# Patient Record
Sex: FEMALE | Race: WHITE | Employment: UNEMPLOYED | URBAN - METROPOLITAN AREA
[De-identification: names, ages, dates, MRNs, and addresses within clinical notes are randomized per-mention and may not be internally consistent; named-entity substitution may affect disease eponyms.]

---

## 2018-01-24 PROBLEM — F25.1 SCHIZOAFFECTIVE DISORDER, DEPRESSIVE TYPE (HCC): Status: ACTIVE | Noted: 2018-01-24

## 2018-03-23 ENCOUNTER — HOSPITAL ENCOUNTER (EMERGENCY)
Age: 28
Discharge: HOME | End: 2018-03-23
Payer: COMMERCIAL

## 2018-03-23 VITALS
DIASTOLIC BLOOD PRESSURE: 76 MMHG | RESPIRATION RATE: 16 BRPM | OXYGEN SATURATION: 95 % | HEIGHT: 64 IN | SYSTOLIC BLOOD PRESSURE: 112 MMHG | TEMPERATURE: 98.3 F | BODY MASS INDEX: 18.78 KG/M2 | WEIGHT: 110 LBS | HEART RATE: 90 BPM

## 2018-03-23 DIAGNOSIS — N89.8 VAGINAL DISCHARGE: ICD-10-CM

## 2018-03-23 DIAGNOSIS — Z20.2 EXPOSURE TO CHLAMYDIA: ICD-10-CM

## 2018-03-23 DIAGNOSIS — A59.9 TRICHOMONAS INFECTION: Primary | ICD-10-CM

## 2018-03-23 LAB
BACTERIA WET PREP: ABNORMAL
BACTERIA: ABNORMAL /HPF
BILIRUBIN URINE: NEGATIVE
BLOOD, URINE: ABNORMAL
CLARITY: ABNORMAL
CLUE CELLS: ABNORMAL
COLOR: ABNORMAL
EPITHELIAL CELLS WET PREP: ABNORMAL
EPITHELIAL CELLS, UA: ABNORMAL /HPF
GLUCOSE URINE: NEGATIVE MG/DL
HCG(URINE) PREGNANCY TEST: NEGATIVE
KETONES, URINE: NEGATIVE MG/DL
LEUKOCYTE ESTERASE, URINE: NEGATIVE
NITRITE, URINE: POSITIVE
PH UA: 5.5 (ref 5–9)
PROTEIN UA: NEGATIVE MG/DL
RBC UA: ABNORMAL /HPF (ref 0–2)
RBC WET PREP: ABNORMAL
SOURCE WET PREP: ABNORMAL
SPECIFIC GRAVITY UA: 1.02 (ref 1–1.03)
TRICHOMONAS PREP: ABNORMAL
UROBILINOGEN, URINE: 1 E.U./DL
WBC UA: ABNORMAL /HPF (ref 0–5)
WBC WET PREP: ABNORMAL
YEAST WET PREP: ABNORMAL

## 2018-03-23 PROCEDURE — 81025 URINE PREGNANCY TEST: CPT

## 2018-03-23 PROCEDURE — 96372 THER/PROPH/DIAG INJ SC/IM: CPT

## 2018-03-23 PROCEDURE — 99283 EMERGENCY DEPT VISIT LOW MDM: CPT

## 2018-03-23 PROCEDURE — 6370000000 HC RX 637 (ALT 250 FOR IP): Performed by: NURSE PRACTITIONER

## 2018-03-23 PROCEDURE — 6360000002 HC RX W HCPCS: Performed by: NURSE PRACTITIONER

## 2018-03-23 PROCEDURE — 87591 N.GONORRHOEAE DNA AMP PROB: CPT

## 2018-03-23 PROCEDURE — 81001 URINALYSIS AUTO W/SCOPE: CPT

## 2018-03-23 PROCEDURE — 87491 CHLMYD TRACH DNA AMP PROBE: CPT

## 2018-03-23 PROCEDURE — 87210 SMEAR WET MOUNT SALINE/INK: CPT

## 2018-03-23 RX ORDER — CEFTRIAXONE SODIUM 250 MG/1
250 INJECTION, POWDER, FOR SOLUTION INTRAMUSCULAR; INTRAVENOUS ONCE
Status: COMPLETED | OUTPATIENT
Start: 2018-03-23 | End: 2018-03-23

## 2018-03-23 RX ORDER — METRONIDAZOLE 500 MG/1
2000 TABLET ORAL ONCE
Status: COMPLETED | OUTPATIENT
Start: 2018-03-23 | End: 2018-03-23

## 2018-03-23 RX ORDER — AZITHROMYCIN 250 MG/1
1000 TABLET, FILM COATED ORAL ONCE
Status: COMPLETED | OUTPATIENT
Start: 2018-03-23 | End: 2018-03-23

## 2018-03-23 RX ORDER — ONDANSETRON 4 MG/1
4 TABLET, ORALLY DISINTEGRATING ORAL ONCE
Status: COMPLETED | OUTPATIENT
Start: 2018-03-23 | End: 2018-03-23

## 2018-03-23 RX ADMIN — PENICILLIN G BENZATHINE 1.2 MILLION UNITS: 1200000 INJECTION, SUSPENSION INTRAMUSCULAR at 17:56

## 2018-03-23 RX ADMIN — METRONIDAZOLE 2000 MG: 500 TABLET ORAL at 18:19

## 2018-03-23 RX ADMIN — AZITHROMYCIN 1000 MG: 250 TABLET, FILM COATED ORAL at 17:53

## 2018-03-23 RX ADMIN — ONDANSETRON 4 MG: 4 TABLET, ORALLY DISINTEGRATING ORAL at 18:19

## 2018-03-23 RX ADMIN — CEFTRIAXONE SODIUM 250 MG: 250 INJECTION, POWDER, FOR SOLUTION INTRAMUSCULAR; INTRAVENOUS at 17:56

## 2018-03-23 NOTE — ED NOTES
Discharged- to follow with Jan Mendez [address] , [address] [facility]  03/23/18 2837

## 2018-03-26 LAB
N GONORRHOEAE AMPLIFIED DET: ABNORMAL
ORGANISM: ABNORMAL

## 2018-10-20 ENCOUNTER — HOSPITAL ENCOUNTER (EMERGENCY)
Age: 28
Discharge: HOME | End: 2018-10-20
Payer: MEDICAID

## 2018-10-20 VITALS
RESPIRATION RATE: 17 BRPM | WEIGHT: 110 LBS | DIASTOLIC BLOOD PRESSURE: 88 MMHG | SYSTOLIC BLOOD PRESSURE: 136 MMHG | TEMPERATURE: 97.3 F | HEART RATE: 100 BPM | BODY MASS INDEX: 18.88 KG/M2 | OXYGEN SATURATION: 99 %

## 2018-10-20 DIAGNOSIS — K02.9 PAIN DUE TO DENTAL CARIES: Primary | ICD-10-CM

## 2018-10-20 PROCEDURE — 99282 EMERGENCY DEPT VISIT SF MDM: CPT

## 2018-10-20 RX ORDER — NAPROXEN 500 MG/1
500 TABLET ORAL 2 TIMES DAILY
Qty: 14 TABLET | Refills: 0 | Status: SHIPPED | OUTPATIENT
Start: 2018-10-20 | End: 2020-04-17 | Stop reason: ALTCHOICE

## 2018-10-20 RX ORDER — PENICILLIN V POTASSIUM 500 MG/1
500 TABLET ORAL 4 TIMES DAILY
Qty: 40 TABLET | Refills: 0 | Status: SHIPPED | OUTPATIENT
Start: 2018-10-20 | End: 2018-10-30

## 2018-10-20 ASSESSMENT — PAIN DESCRIPTION - ORIENTATION: ORIENTATION: LEFT

## 2018-10-20 ASSESSMENT — PAIN DESCRIPTION - PAIN TYPE: TYPE: ACUTE PAIN

## 2018-10-20 ASSESSMENT — PAIN DESCRIPTION - LOCATION: LOCATION: TEETH

## 2018-10-20 ASSESSMENT — PAIN SCALES - GENERAL: PAINLEVEL_OUTOF10: 10

## 2018-10-20 ASSESSMENT — PAIN DESCRIPTION - DESCRIPTORS: DESCRIPTORS: ACHING

## 2018-10-20 NOTE — ED PROVIDER NOTES
dental abscess. Patient handling secretions without difficulty. Patient has no unilateral swelling of the throat or uveal deviation. At this time patient will be given Pen-Vee K, naproxen and lidocaine swish and spit. She was instructed to follow-up with  [facility] whose information was provided. She was also instructed to follow-up with her primary care provider. Patient is agreeable with plan. Patient was explicitly instructed on specific signs and symptoms on which to return to the emergency room for. Patient was instructed to return to the ER for any new or worsening symptoms. Additional discharge instructions were given verbally. All questions were answered. Patient is comfortable and agreeable with discharge plan. Patient in no acute distress and non-toxic in appearance. At this time the patient is without objective evidence of an acute process requiring hospitalization or inpatient management. They have remained hemodynamically stable throughout their entire ED visit and are stable for discharge with outpatient follow-up. The plan has been discussed in detail and they are aware of the specific conditions for emergent return, as well as the importance of follow-up. The emergency provider has spoken with the patient and discussed todays results, in addition to providing specific details for the plan of care and counseling regarding the diagnosis and prognosis. Questions are answered at this time and they are agreeable with the plan. Assessment      1.  Pain due to dental caries      Plan   Discharge to home  Patient condition is stable    New Medications     New Prescriptions    LIDOCAINE VISCOUS (XYLOCAINE) 2 % SOLUTION    Take 10 mLs by mouth as needed for Irritation    NAPROXEN (NAPROSYN) 500 MG TABLET    Take 1 tablet by mouth 2 times daily for 7 days    PENICILLIN V POTASSIUM (VEETID) 500 MG TABLET    Take 1 tablet by mouth 4 times daily for 10 days     Electronically

## 2018-10-22 ENCOUNTER — HOSPITAL ENCOUNTER (EMERGENCY)
Age: 28
Discharge: HOME | End: 2018-10-22
Payer: MEDICAID

## 2018-10-22 VITALS
OXYGEN SATURATION: 98 % | HEART RATE: 100 BPM | BODY MASS INDEX: 22.66 KG/M2 | SYSTOLIC BLOOD PRESSURE: 130 MMHG | RESPIRATION RATE: 18 BRPM | TEMPERATURE: 97.7 F | DIASTOLIC BLOOD PRESSURE: 81 MMHG | WEIGHT: 120 LBS | HEIGHT: 61 IN

## 2018-10-22 DIAGNOSIS — K08.89 PAIN, DENTAL: Primary | ICD-10-CM

## 2018-10-22 PROCEDURE — 6370000000 HC RX 637 (ALT 250 FOR IP): Performed by: NURSE PRACTITIONER

## 2018-10-22 PROCEDURE — 99282 EMERGENCY DEPT VISIT SF MDM: CPT

## 2018-10-22 RX ORDER — OXYCODONE HYDROCHLORIDE AND ACETAMINOPHEN 5; 325 MG/1; MG/1
1 TABLET ORAL ONCE
Status: COMPLETED | OUTPATIENT
Start: 2018-10-22 | End: 2018-10-22

## 2018-10-22 RX ADMIN — OXYCODONE AND ACETAMINOPHEN 1 TABLET: 5; 325 TABLET ORAL at 10:54

## 2018-10-22 ASSESSMENT — PAIN SCALES - GENERAL
PAINLEVEL_OUTOF10: 10
PAINLEVEL_OUTOF10: 7

## 2018-10-22 ASSESSMENT — PAIN DESCRIPTION - ORIENTATION: ORIENTATION: RIGHT

## 2018-10-22 ASSESSMENT — PAIN DESCRIPTION - LOCATION: LOCATION: MOUTH

## 2018-10-22 ASSESSMENT — PAIN DESCRIPTION - DESCRIPTORS: DESCRIPTORS: ACHING

## 2018-10-22 ASSESSMENT — PAIN DESCRIPTION - PAIN TYPE: TYPE: ACUTE PAIN

## 2018-10-22 ASSESSMENT — PAIN DESCRIPTION - FREQUENCY: FREQUENCY: CONTINUOUS

## 2018-10-22 ASSESSMENT — PAIN DESCRIPTION - ONSET: ONSET: ON-GOING

## 2022-05-16 ENCOUNTER — HOSPITAL ENCOUNTER (OUTPATIENT)
Age: 32
Discharge: HOME | End: 2022-05-16
Payer: COMMERCIAL

## 2022-05-16 LAB
GLUCOSE TOLERANCE SCREEN 50G: 101 MG/DL (ref 70–140)
HCT VFR BLD CALC: 38.6 % (ref 34–48)
HEMOGLOBIN: 13 G/DL (ref 11.5–15.5)
MCH RBC QN AUTO: 32.7 PG (ref 26–35)
MCHC RBC AUTO-ENTMCNC: 33.7 % (ref 32–34.5)
MCV RBC AUTO: 97.2 FL (ref 80–99.9)
PDW BLD-RTO: 12.7 FL (ref 11.5–15)
PLATELET # BLD: 197 E9/L (ref 130–450)
PMV BLD AUTO: 11 FL (ref 7–12)
RBC # BLD: 3.97 E12/L (ref 3.5–5.5)
WBC # BLD: 9.4 E9/L (ref 4.5–11.5)

## 2022-05-16 PROCEDURE — 36415 COLL VENOUS BLD VENIPUNCTURE: CPT

## 2022-05-16 PROCEDURE — 82950 GLUCOSE TEST: CPT

## 2022-05-16 PROCEDURE — 85027 COMPLETE CBC AUTOMATED: CPT

## 2022-07-07 ENCOUNTER — HOSPITAL ENCOUNTER (OUTPATIENT)
Age: 32
Discharge: HOME | End: 2022-07-07
Attending: OBSTETRICS & GYNECOLOGY | Admitting: OBSTETRICS & GYNECOLOGY
Payer: COMMERCIAL

## 2022-07-07 VITALS
HEART RATE: 125 BPM | RESPIRATION RATE: 20 BRPM | TEMPERATURE: 98 F | DIASTOLIC BLOOD PRESSURE: 74 MMHG | SYSTOLIC BLOOD PRESSURE: 111 MMHG

## 2022-07-07 PROCEDURE — 99202 OFFICE O/P NEW SF 15 MIN: CPT

## 2022-07-07 NOTE — PROGRESS NOTES
GP: 5/3     EDC:32w6d (8/26/2022    Patient arrived ambulatory to the Labor and Delivery unit with complaints of (Bleeding through 3 panty liners with dark red blood)   EFM applied and fetal well being established. Patient assessed and information obtained   about health and history. Patient oriented to room and call light. Dr. Victoria Red at [phone]  Orders received to call office for prenatals and U.S. Plan of care discussed with patient. Patient verbalizes understanding.

## 2022-07-07 NOTE — PROGRESS NOTES
Dr. Danie Lilly covering for Dr. Aguilar CARRILLO preformed patient Closed and thick and Patient reported bleeding assessed by doctor with no further bleeding. Awaiting patient Prenatals and U/S form office for further orders. Patient on EFM wnl.

## 2022-08-07 LAB — GROUP B STREP CULTURE: NORMAL

## 2023-09-26 LAB
CHLAMYDIA TRACH., AMPLIFIED: NEGATIVE
N. GONORRHEA, AMPLIFIED: NEGATIVE

## 2025-04-28 ENCOUNTER — HOSPITAL ENCOUNTER (INPATIENT)
Facility: HOSPITAL | Age: 35
LOS: 2 days | Discharge: NURSING, REHAB OR LONG-TERM CARE | End: 2025-05-01
Attending: EMERGENCY MEDICINE | Admitting: STUDENT IN AN ORGANIZED HEALTH CARE EDUCATION/TRAINING PROGRAM
Payer: COMMERCIAL

## 2025-04-28 ENCOUNTER — APPOINTMENT (OUTPATIENT)
Dept: CT IMAGING | Age: 35
End: 2025-04-28
Payer: COMMERCIAL

## 2025-04-28 ENCOUNTER — HOSPITAL ENCOUNTER (EMERGENCY)
Age: 35
Discharge: LEFT BEFORE DISCHARGE | End: 2025-04-28
Attending: STUDENT IN AN ORGANIZED HEALTH CARE EDUCATION/TRAINING PROGRAM
Payer: COMMERCIAL

## 2025-04-28 VITALS
SYSTOLIC BLOOD PRESSURE: 91 MMHG | DIASTOLIC BLOOD PRESSURE: 58 MMHG | HEIGHT: 62 IN | TEMPERATURE: 97.8 F | HEART RATE: 64 BPM | RESPIRATION RATE: 18 BRPM | OXYGEN SATURATION: 95 % | WEIGHT: 156 LBS | BODY MASS INDEX: 28.71 KG/M2

## 2025-04-28 DIAGNOSIS — R74.01 TRANSAMINITIS: ICD-10-CM

## 2025-04-28 DIAGNOSIS — Z86.19 HISTORY OF HEPATITIS C: ICD-10-CM

## 2025-04-28 DIAGNOSIS — R74.01 TRANSAMINITIS: Primary | ICD-10-CM

## 2025-04-28 DIAGNOSIS — K59.03 DRUG-INDUCED CONSTIPATION: ICD-10-CM

## 2025-04-28 DIAGNOSIS — Z53.29 LEFT AGAINST MEDICAL ADVICE: Primary | ICD-10-CM

## 2025-04-28 LAB
ALBUMIN SERPL-MCNC: 3.8 G/DL (ref 3.5–5.2)
ALP SERPL-CCNC: 314 U/L (ref 35–104)
ALT SERPL-CCNC: 3253 U/L (ref 0–32)
AMPHET UR QL SCN: POSITIVE
ANION GAP SERPL CALCULATED.3IONS-SCNC: 14 MMOL/L (ref 7–16)
APPEARANCE UR: CLEAR
AST SERPL-CCNC: 3707 U/L (ref 0–31)
ATYPICAL LYMPHOCYTE ABSOLUTE COUNT: 1.26 K/UL (ref 0–0.46)
ATYPICAL LYMPHOCYTES: 10 % (ref 0–4)
BACTERIA URNS QL MICRO: ABNORMAL
BARBITURATES UR QL SCN: NEGATIVE
BASOPHILS # BLD: 0 K/UL (ref 0–0.2)
BASOPHILS NFR BLD: 0 % (ref 0–2)
BENZODIAZ UR QL: NEGATIVE
BILIRUB DIRECT SERPL-MCNC: 7 MG/DL (ref 0–0.3)
BILIRUB INDIRECT SERPL-MCNC: 3.4 MG/DL (ref 0–1)
BILIRUB SERPL-MCNC: 10.4 MG/DL (ref 0–1.2)
BILIRUB UR QL STRIP: ABNORMAL
BILIRUB UR STRIP.AUTO-MCNC: ABNORMAL MG/DL
BUN SERPL-MCNC: 27 MG/DL (ref 6–20)
BUPRENORPHINE UR QL: POSITIVE
CALCIUM SERPL-MCNC: 8.7 MG/DL (ref 8.6–10.2)
CANNABINOIDS UR QL SCN: POSITIVE
CHLORIDE SERPL-SCNC: 94 MMOL/L (ref 98–107)
CLARITY UR: CLEAR
CO2 SERPL-SCNC: 23 MMOL/L (ref 22–29)
COCAINE UR QL SCN: NEGATIVE
COLOR UR: ABNORMAL
COLOR UR: YELLOW
CREAT SERPL-MCNC: 1.5 MG/DL (ref 0.5–1)
CRYSTALS URNS MICRO: ABNORMAL /HPF
EOSINOPHIL # BLD: 0.13 K/UL (ref 0.05–0.5)
EOSINOPHILS RELATIVE PERCENT: 1 % (ref 0–6)
EPI CELLS #/AREA URNS HPF: ABNORMAL /HPF
ERYTHROCYTE [DISTWIDTH] IN BLOOD BY AUTOMATED COUNT: 13.2 % (ref 11.5–15)
FENTANYL UR QL: NEGATIVE
GFR, ESTIMATED: 47 ML/MIN/1.73M2
GLUCOSE SERPL-MCNC: 95 MG/DL (ref 74–99)
GLUCOSE UR STRIP-MCNC: 100 MG/DL
GLUCOSE UR STRIP.AUTO-MCNC: NORMAL MG/DL
HCG SERPL QL: NEGATIVE
HCT VFR BLD AUTO: 49.4 % (ref 34–48)
HGB BLD-MCNC: 17.5 G/DL (ref 11.5–15.5)
HGB UR QL STRIP.AUTO: NEGATIVE
KETONES UR STRIP-MCNC: ABNORMAL MG/DL
KETONES UR STRIP.AUTO-MCNC: NEGATIVE MG/DL
LACTATE BLDV-SCNC: 2.1 MMOL/L (ref 0.5–2.2)
LEUKOCYTE ESTERASE UR QL STRIP.AUTO: ABNORMAL
LEUKOCYTE ESTERASE UR QL STRIP: ABNORMAL
LIPASE SERPL-CCNC: 19 U/L (ref 13–60)
LYMPHOCYTES NFR BLD: 5.04 K/UL (ref 1.5–4)
LYMPHOCYTES RELATIVE PERCENT: 40 % (ref 20–42)
MAGNESIUM SERPL-MCNC: 2.6 MG/DL (ref 1.6–2.6)
MCH RBC QN AUTO: 32.2 PG (ref 26–35)
MCHC RBC AUTO-ENTMCNC: 35.4 G/DL (ref 32–34.5)
MCV RBC AUTO: 90.8 FL (ref 80–99.9)
METHADONE UR QL: NEGATIVE
MONOCYTES NFR BLD: 1.26 K/UL (ref 0.1–0.95)
MONOCYTES NFR BLD: 10 % (ref 2–12)
NEUTROPHILS NFR BLD: 39 % (ref 43–80)
NEUTS SEG NFR BLD: 4.91 K/UL (ref 1.8–7.3)
NITRITE UR QL STRIP.AUTO: NEGATIVE
NITRITE UR QL STRIP: POSITIVE
OPIATES UR QL SCN: NEGATIVE
OXYCODONE UR QL SCN: NEGATIVE
PCP UR QL SCN: NEGATIVE
PH UR STRIP.AUTO: 6.5 [PH]
PH UR STRIP: 5.5 [PH] (ref 5–8)
PLATELET # BLD AUTO: 111 K/UL (ref 130–450)
PMV BLD AUTO: 11.7 FL (ref 7–12)
POTASSIUM SERPL-SCNC: 4.4 MMOL/L (ref 3.5–5)
PROT SERPL-MCNC: 6.7 G/DL (ref 6.4–8.3)
PROT UR STRIP-MCNC: ABNORMAL MG/DL
PROT UR STRIP.AUTO-MCNC: NEGATIVE MG/DL
RBC # BLD AUTO: 5.44 M/UL (ref 3.5–5.5)
RBC # UR STRIP.AUTO: NEGATIVE MG/DL
RBC #/AREA URNS AUTO: NORMAL /HPF
RBC #/AREA URNS HPF: ABNORMAL /HPF
SODIUM SERPL-SCNC: 131 MMOL/L (ref 132–146)
SP GR UR STRIP.AUTO: 1.01
SP GR UR STRIP: 1.01 (ref 1–1.03)
SQUAMOUS #/AREA URNS AUTO: NORMAL /HPF
TEST INFORMATION: ABNORMAL
UROBILINOGEN UR STRIP-ACNC: 4 EU/DL (ref 0–1)
UROBILINOGEN UR STRIP.AUTO-MCNC: ABNORMAL MG/DL
WBC #/AREA URNS AUTO: NORMAL /HPF
WBC #/AREA URNS HPF: ABNORMAL /HPF
WBC OTHER # BLD: 12.6 K/UL (ref 4.5–11.5)

## 2025-04-28 PROCEDURE — 80053 COMPREHEN METABOLIC PANEL: CPT

## 2025-04-28 PROCEDURE — 81001 URINALYSIS AUTO W/SCOPE: CPT

## 2025-04-28 PROCEDURE — 82248 BILIRUBIN DIRECT: CPT | Performed by: STUDENT IN AN ORGANIZED HEALTH CARE EDUCATION/TRAINING PROGRAM

## 2025-04-28 PROCEDURE — 81001 URINALYSIS AUTO W/SCOPE: CPT | Performed by: NURSE PRACTITIONER

## 2025-04-28 PROCEDURE — 82248 BILIRUBIN DIRECT: CPT

## 2025-04-28 PROCEDURE — 2500000004 HC RX 250 GENERAL PHARMACY W/ HCPCS (ALT 636 FOR OP/ED): Mod: JZ | Performed by: NURSE PRACTITIONER

## 2025-04-28 PROCEDURE — 80307 DRUG TEST PRSMV CHEM ANLYZR: CPT

## 2025-04-28 PROCEDURE — 85007 BL SMEAR W/DIFF WBC COUNT: CPT | Performed by: NURSE PRACTITIONER

## 2025-04-28 PROCEDURE — 83605 ASSAY OF LACTIC ACID: CPT | Performed by: NURSE PRACTITIONER

## 2025-04-28 PROCEDURE — 80053 COMPREHEN METABOLIC PANEL: CPT | Performed by: NURSE PRACTITIONER

## 2025-04-28 PROCEDURE — 83690 ASSAY OF LIPASE: CPT | Performed by: NURSE PRACTITIONER

## 2025-04-28 PROCEDURE — 6360000004 HC RX CONTRAST MEDICATION: Performed by: RADIOLOGY

## 2025-04-28 PROCEDURE — 82140 ASSAY OF AMMONIA: CPT | Performed by: NURSE PRACTITIONER

## 2025-04-28 PROCEDURE — 85025 COMPLETE CBC W/AUTO DIFF WBC: CPT

## 2025-04-28 PROCEDURE — 74177 CT ABD & PELVIS W/CONTRAST: CPT

## 2025-04-28 PROCEDURE — 96374 THER/PROPH/DIAG INJ IV PUSH: CPT

## 2025-04-28 PROCEDURE — 2580000003 HC RX 258

## 2025-04-28 PROCEDURE — 36415 COLL VENOUS BLD VENIPUNCTURE: CPT | Performed by: NURSE PRACTITIONER

## 2025-04-28 PROCEDURE — 99285 EMERGENCY DEPT VISIT HI MDM: CPT

## 2025-04-28 PROCEDURE — 85610 PROTHROMBIN TIME: CPT | Performed by: NURSE PRACTITIONER

## 2025-04-28 PROCEDURE — 83690 ASSAY OF LIPASE: CPT

## 2025-04-28 PROCEDURE — 87086 URINE CULTURE/COLONY COUNT: CPT | Mod: PORLAB | Performed by: NURSE PRACTITIONER

## 2025-04-28 PROCEDURE — 85027 COMPLETE CBC AUTOMATED: CPT | Performed by: NURSE PRACTITIONER

## 2025-04-28 PROCEDURE — 81256 HFE GENE: CPT | Performed by: NURSE PRACTITIONER

## 2025-04-28 PROCEDURE — 99285 EMERGENCY DEPT VISIT HI MDM: CPT | Mod: 25 | Performed by: EMERGENCY MEDICINE

## 2025-04-28 PROCEDURE — 84703 CHORIONIC GONADOTROPIN ASSAY: CPT

## 2025-04-28 PROCEDURE — 83605 ASSAY OF LACTIC ACID: CPT

## 2025-04-28 PROCEDURE — 83735 ASSAY OF MAGNESIUM: CPT

## 2025-04-28 RX ORDER — ONDANSETRON HYDROCHLORIDE 2 MG/ML
4 INJECTION, SOLUTION INTRAVENOUS ONCE
Status: COMPLETED | OUTPATIENT
Start: 2025-04-28 | End: 2025-04-28

## 2025-04-28 RX ORDER — 0.9 % SODIUM CHLORIDE 0.9 %
1000 INTRAVENOUS SOLUTION INTRAVENOUS ONCE
Status: COMPLETED | OUTPATIENT
Start: 2025-04-28 | End: 2025-04-28

## 2025-04-28 RX ORDER — IOPAMIDOL 755 MG/ML
75 INJECTION, SOLUTION INTRAVASCULAR
Status: COMPLETED | OUTPATIENT
Start: 2025-04-28 | End: 2025-04-28

## 2025-04-28 RX ADMIN — ONDANSETRON 4 MG: 2 INJECTION INTRAMUSCULAR; INTRAVENOUS at 23:59

## 2025-04-28 RX ADMIN — IOPAMIDOL 75 ML: 755 INJECTION, SOLUTION INTRAVENOUS at 14:18

## 2025-04-28 RX ADMIN — SODIUM CHLORIDE 1000 ML: 0.9 INJECTION, SOLUTION INTRAVENOUS at 13:57

## 2025-04-28 ASSESSMENT — PAIN DESCRIPTION - LOCATION
LOCATION: ABDOMEN;BACK
LOCATION: ABDOMEN

## 2025-04-28 ASSESSMENT — PAIN SCALES - GENERAL
PAINLEVEL_OUTOF10: 4
PAINLEVEL_OUTOF10: 7

## 2025-04-28 ASSESSMENT — PAIN - FUNCTIONAL ASSESSMENT: PAIN_FUNCTIONAL_ASSESSMENT: 0-10

## 2025-04-28 ASSESSMENT — PAIN DESCRIPTION - DESCRIPTORS: DESCRIPTORS: SHARP

## 2025-04-28 ASSESSMENT — PAIN DESCRIPTION - FREQUENCY: FREQUENCY: CONSTANT/CONTINUOUS

## 2025-04-28 ASSESSMENT — LIFESTYLE VARIABLES
HOW OFTEN DO YOU HAVE A DRINK CONTAINING ALCOHOL: MONTHLY OR LESS
HOW MANY STANDARD DRINKS CONTAINING ALCOHOL DO YOU HAVE ON A TYPICAL DAY: 1 OR 2

## 2025-04-28 ASSESSMENT — COLUMBIA-SUICIDE SEVERITY RATING SCALE - C-SSRS
2. HAVE YOU ACTUALLY HAD ANY THOUGHTS OF KILLING YOURSELF?: NO
1. IN THE PAST MONTH, HAVE YOU WISHED YOU WERE DEAD OR WISHED YOU COULD GO TO SLEEP AND NOT WAKE UP?: NO
6. HAVE YOU EVER DONE ANYTHING, STARTED TO DO ANYTHING, OR PREPARED TO DO ANYTHING TO END YOUR LIFE?: NO

## 2025-04-28 ASSESSMENT — PAIN DESCRIPTION - PAIN TYPE: TYPE: CHRONIC PAIN

## 2025-04-28 ASSESSMENT — PAIN DESCRIPTION - ORIENTATION: ORIENTATION: UPPER

## 2025-04-28 NOTE — ED NOTES
Patient refusing additional blood work and has asked to leave. Educated patient on importance of tests. Notified resident, who will speak to patient.

## 2025-04-28 NOTE — ED PROVIDER NOTES
Spoke with hospitalist who accepted patient for admission.      I was alerted by nursing staff that patient no longer wished to stay.  After speaking with the patient she is saying that she only came here because she went to and that she does not wish to stay at this hospital.  She was very upset about having tohave multiple IV attempts.  I discussed with her the increased morbidity and mortality of leaving at the medical advice especially given her laboratory workup to which she was still desiring to leave.  I felt that she was clinically sober and had decision-making capacity.  Patient signed out AGAINST MEDICAL ADVICE.    Please refer to the ED Course as available for additional MDM.  ED Course as of 04/28/25 1708   Mon Apr 28, 2025   1545 Spoke with GI who will come evaluate the patient in the ED.  [CW]   1702 Patient does not want to stay for admission to the hospital she was frustrated that we had to get another IV inpatient.  She was offered medication including Suboxone in emergency department patient had declined this.  Patient stated that she does not want to stay she plans to go to another hospital for treatment.  Plan was to admit patient since she did state that the jaundice is new does have elevated bilirubin.  Patient does not have any recent lab work available at our facility PT/INR was pending hepatitis was ordered but not collected.  Blood cultures were pending GI was on the way to see patient emergency department before admission.  Patient decided that she did not want to stay and has Make her decisions she does understand she potentially has a life-threatening diagnosis and could decompensate she was advised to not sign out AGAINST MEDICAL ADVICE and chose to still leave [SS]      ED Course User Index  [CW] Huyen Marshall, DO  [SS] Zita Klein MD        Social Determinants affecting Dx or Tx: From [facility].  Prior drug abuser.    Records Reviewed: No recent prior records for

## 2025-04-28 NOTE — ED NOTES
Department of Emergency Medicine  FIRST PROVIDER TRIAGE NOTE             Independent MLP           4/28/25  11:45 AM EDT    Date of Encounter: 4/28/25   MRN: [id number]      HPI: Christine Fuentes is a 35 y.o. female who presents to the ED for Abdominal Pain (Upper mid abdominal pain with constipation and nausea. Hep C - yellowing of eyes that started this morning. )       ROS: Negative for cp or sob.    PE: Gen Appearance/Constitutional: alert  Musculoskeletal: moves all extremities x 4    Initial Plan of Care: All treatment areas with department are currently occupied.      Plan to order/Initiate the following while awaiting opening in ED: Triage evaluation  .     Provider-Patient relationship only established for Provider In Triage (PIT).  Full assessment, HPI and examination not performed, therefore, it is not yet possible to state whether or not an emergency medical condition exists     Initial Plan of Care: Initiate Treatment-Testing, Proceed toTreatment Area When Bed Available for ED Attending/MLP to Continue Care  Secondary to high volume, low staffing, and/or boarding- patient to await bed availability.     This ends my PIT-Patient relationship.  Care of patient relinquished after triage         Electronically signed by PURVI Light CNP   DD: 4/28/25

## 2025-04-28 NOTE — CONSULTS
CONSULT  Ricardo Barakat M.D.  [facility]  Dr. Re Mendoza M.D.,  Dr. Colton Rachel M.D.,  Dr. Joby Camarena D.O.,  Dr. Vance Montes D.O. ,  Dr. Brian Cannon M.D.,          Christine Bensonl  34 y.o.  female      Re: \"Patient with acute hepatitis\"  Requesting physician: Dr. Marshall  Date:3:22 PM 4/28/2025          HPI: 34-year-old female patient seen in the hospital for above described issue.  She presents to the emergency department with no bowel movement for 3 to 4 weeks with abdominal pain/discomfort as well as some nausea but no emesis.  She noticed that she has been jaundiced this morning.  Patient reports pertinent history of hepatitis C without treatment.  She reports previous history of IV drug use and reports that she has recently started to use intravenous drugs again.  Patient denies any significant use of Tylenol or Tylenol containing products  Laboratory work reveals transaminases in the 3000's without baseline to compare.  Total bilirubin is 10.4 with direct bilirubin of 7.  Also elevated alkaline phosphatase at 314.  Hemoglobin is 17.5, white blood cell count is 12.6.  Platelet count is 211 once again without baseline to compare.  CT imaging has been obtained.  CT scan of the abdomen and pelvis reported to show periportal edema with diffuse mild gallbladder wall thickening suggestive of acute hepatitis and small volume of low-density fluid in the pelvis.    Information sources:   -Patient  -medical record  -health care team    PMHx:History reviewed. No pertinent past medical history.    PSHx:History reviewed. No pertinent surgical history.    Meds:  No current facility-administered medications for this encounter.     No current outpatient medications on file.        SocHx:  Social History     Socioeconomic History    Marital status: Single     Spouse name: Not on file    Number of children: Not on file    Years of education: Not on file    Highest education level: Not on file

## 2025-04-29 PROBLEM — R74.01 TRANSAMINITIS: Status: ACTIVE | Noted: 2025-04-29

## 2025-04-29 PROBLEM — R17 ELEVATED BILIRUBIN: Status: ACTIVE | Noted: 2025-04-29

## 2025-04-29 LAB
ALBUMIN SERPL BCP-MCNC: 3.7 G/DL (ref 3.4–5)
ALP SERPL-CCNC: 231 U/L (ref 33–110)
ALT SERPL W P-5'-P-CCNC: 2212 U/L (ref 7–45)
AMMONIA PLAS-SCNC: 75 UMOL/L (ref 16–53)
ANION GAP SERPL CALC-SCNC: 15 MMOL/L (ref 10–20)
APAP SERPL-MCNC: <10 UG/ML (ref ?–30)
AST SERPL W P-5'-P-CCNC: 1991 U/L (ref 9–39)
BASOPHILS # BLD MANUAL: 0 X10*3/UL (ref 0–0.1)
BASOPHILS NFR BLD MANUAL: 0 %
BILIRUB DIRECT SERPL-MCNC: 6.1 MG/DL (ref 0–0.3)
BILIRUB SERPL-MCNC: 11.3 MG/DL (ref 0–1.2)
BUN SERPL-MCNC: 22 MG/DL (ref 6–23)
CALCIUM SERPL-MCNC: 7.9 MG/DL (ref 8.6–10.3)
CERULOPLASMIN SERPL-MCNC: 30.5 MG/DL (ref 20–60)
CHLORIDE SERPL-SCNC: 96 MMOL/L (ref 98–107)
CK SERPL-CCNC: 85 U/L (ref 0–215)
CO2 SERPL-SCNC: 25 MMOL/L (ref 21–32)
CREAT SERPL-MCNC: 1.21 MG/DL (ref 0.5–1.05)
EGFRCR SERPLBLD CKD-EPI 2021: 61 ML/MIN/1.73M*2
EOSINOPHIL # BLD MANUAL: 0.31 X10*3/UL (ref 0–0.7)
EOSINOPHIL NFR BLD MANUAL: 2 %
ERYTHROCYTE [DISTWIDTH] IN BLOOD BY AUTOMATED COUNT: 13 % (ref 11.5–14.5)
ETHANOL SERPL-MCNC: <10 MG/DL
FERRITIN SERPL-MCNC: 2470 NG/ML (ref 8–150)
GLUCOSE SERPL-MCNC: 118 MG/DL (ref 74–99)
HAV AB SER QL IA: REACTIVE
HAV IGM SER QL: NONREACTIVE
HBV CORE AB SER QL: NONREACTIVE
HBV CORE IGM SER QL: NONREACTIVE
HBV SURFACE AB SER-ACNC: 778.5 MIU/ML
HBV SURFACE AG SERPL QL IA: NONREACTIVE
HCT VFR BLD AUTO: 41.8 % (ref 36–46)
HCV AB SER QL: REACTIVE
HGB BLD-MCNC: 15 G/DL (ref 12–16)
HOLD SPECIMEN: NORMAL
IGA SERPL-MCNC: 211 MG/DL (ref 70–400)
IGG SERPL-MCNC: 1260 MG/DL (ref 700–1600)
IGM SERPL-MCNC: 286 MG/DL (ref 40–230)
IMM GRANULOCYTES # BLD AUTO: 0.03 X10*3/UL (ref 0–0.7)
IMM GRANULOCYTES NFR BLD AUTO: 0.2 % (ref 0–0.9)
INR PPP: 1.2 (ref 0.9–1.1)
IRON SATN MFR SERPL: ABNORMAL %
IRON SERPL-MCNC: 181 UG/DL (ref 35–150)
LACTATE SERPL-SCNC: 1 MMOL/L (ref 0.4–2)
LIPASE SERPL-CCNC: 12 U/L (ref 9–82)
LYMPHOCYTES # BLD MANUAL: 9.11 X10*3/UL (ref 1.2–4.8)
LYMPHOCYTES NFR BLD MANUAL: 58 %
MCH RBC QN AUTO: 31.2 PG (ref 26–34)
MCHC RBC AUTO-ENTMCNC: 35.9 G/DL (ref 32–36)
MCV RBC AUTO: 87 FL (ref 80–100)
MONOCYTES # BLD MANUAL: 0.16 X10*3/UL (ref 0.1–1)
MONOCYTES NFR BLD MANUAL: 1 %
NEUTS SEG # BLD MANUAL: 3.61 X10*3/UL (ref 1.2–7)
NEUTS SEG NFR BLD MANUAL: 23 %
NRBC BLD-RTO: 0.1 /100 WBCS (ref 0–0)
PATH REVIEW-CBC DIFFERENTIAL: NORMAL
PLATELET # BLD AUTO: 113 X10*3/UL (ref 150–450)
POTASSIUM SERPL-SCNC: 3.5 MMOL/L (ref 3.5–5.3)
PROT SERPL-MCNC: 6.2 G/DL (ref 6.4–8.2)
PROTHROMBIN TIME: 13.4 SECONDS (ref 9.8–12.4)
RBC # BLD AUTO: 4.81 X10*6/UL (ref 4–5.2)
RBC MORPH BLD: ABNORMAL
SODIUM SERPL-SCNC: 132 MMOL/L (ref 136–145)
TIBC SERPL-MCNC: ABNORMAL UG/DL
TOTAL CELLS COUNTED BLD: 100
UIBC SERPL-MCNC: <55 UG/DL (ref 110–370)
VARIANT LYMPHS # BLD MANUAL: 2.51 X10*3/UL (ref 0–0.5)
VARIANT LYMPHS NFR BLD: 16 %
WBC # BLD AUTO: 15.7 X10*3/UL (ref 4.4–11.3)

## 2025-04-29 PROCEDURE — 80074 ACUTE HEPATITIS PANEL: CPT | Mod: PORLAB | Performed by: STUDENT IN AN ORGANIZED HEALTH CARE EDUCATION/TRAINING PROGRAM

## 2025-04-29 PROCEDURE — 99222 1ST HOSP IP/OBS MODERATE 55: CPT | Performed by: STUDENT IN AN ORGANIZED HEALTH CARE EDUCATION/TRAINING PROGRAM

## 2025-04-29 PROCEDURE — 36415 COLL VENOUS BLD VENIPUNCTURE: CPT | Performed by: STUDENT IN AN ORGANIZED HEALTH CARE EDUCATION/TRAINING PROGRAM

## 2025-04-29 PROCEDURE — 80143 DRUG ASSAY ACETAMINOPHEN: CPT | Performed by: STUDENT IN AN ORGANIZED HEALTH CARE EDUCATION/TRAINING PROGRAM

## 2025-04-29 PROCEDURE — 83516 IMMUNOASSAY NONANTIBODY: CPT | Performed by: STUDENT IN AN ORGANIZED HEALTH CARE EDUCATION/TRAINING PROGRAM

## 2025-04-29 PROCEDURE — 82728 ASSAY OF FERRITIN: CPT | Performed by: STUDENT IN AN ORGANIZED HEALTH CARE EDUCATION/TRAINING PROGRAM

## 2025-04-29 PROCEDURE — 86708 HEPATITIS A ANTIBODY: CPT | Mod: PORLAB | Performed by: STUDENT IN AN ORGANIZED HEALTH CARE EDUCATION/TRAINING PROGRAM

## 2025-04-29 PROCEDURE — 86706 HEP B SURFACE ANTIBODY: CPT | Mod: PORLAB | Performed by: STUDENT IN AN ORGANIZED HEALTH CARE EDUCATION/TRAINING PROGRAM

## 2025-04-29 PROCEDURE — 2500000002 HC RX 250 W HCPCS SELF ADMINISTERED DRUGS (ALT 637 FOR MEDICARE OP, ALT 636 FOR OP/ED)

## 2025-04-29 PROCEDURE — 1210000001 HC SEMI-PRIVATE ROOM DAILY

## 2025-04-29 PROCEDURE — 2500000004 HC RX 250 GENERAL PHARMACY W/ HCPCS (ALT 636 FOR OP/ED): Mod: JZ | Performed by: INTERNAL MEDICINE

## 2025-04-29 PROCEDURE — 36415 COLL VENOUS BLD VENIPUNCTURE: CPT | Performed by: INTERNAL MEDICINE

## 2025-04-29 PROCEDURE — 82784 ASSAY IGA/IGD/IGG/IGM EACH: CPT | Mod: PORLAB | Performed by: STUDENT IN AN ORGANIZED HEALTH CARE EDUCATION/TRAINING PROGRAM

## 2025-04-29 PROCEDURE — 86704 HEP B CORE ANTIBODY TOTAL: CPT | Mod: PORLAB | Performed by: STUDENT IN AN ORGANIZED HEALTH CARE EDUCATION/TRAINING PROGRAM

## 2025-04-29 PROCEDURE — 86015 ACTIN ANTIBODY EACH: CPT | Mod: PORLAB | Performed by: STUDENT IN AN ORGANIZED HEALTH CARE EDUCATION/TRAINING PROGRAM

## 2025-04-29 PROCEDURE — 82077 ASSAY SPEC XCP UR&BREATH IA: CPT | Performed by: STUDENT IN AN ORGANIZED HEALTH CARE EDUCATION/TRAINING PROGRAM

## 2025-04-29 PROCEDURE — 83540 ASSAY OF IRON: CPT | Performed by: STUDENT IN AN ORGANIZED HEALTH CARE EDUCATION/TRAINING PROGRAM

## 2025-04-29 PROCEDURE — 82550 ASSAY OF CK (CPK): CPT | Performed by: INTERNAL MEDICINE

## 2025-04-29 PROCEDURE — 87522 HEPATITIS C REVRS TRNSCRPJ: CPT | Mod: PORLAB | Performed by: INTERNAL MEDICINE

## 2025-04-29 PROCEDURE — 82104 ALPHA-1-ANTITRYPSIN PHENO: CPT | Performed by: STUDENT IN AN ORGANIZED HEALTH CARE EDUCATION/TRAINING PROGRAM

## 2025-04-29 PROCEDURE — 86381 MITOCHONDRIAL ANTIBODY EACH: CPT | Mod: PORLAB | Performed by: STUDENT IN AN ORGANIZED HEALTH CARE EDUCATION/TRAINING PROGRAM

## 2025-04-29 PROCEDURE — 86376 MICROSOMAL ANTIBODY EACH: CPT | Performed by: STUDENT IN AN ORGANIZED HEALTH CARE EDUCATION/TRAINING PROGRAM

## 2025-04-29 PROCEDURE — 82390 ASSAY OF CERULOPLASMIN: CPT | Mod: PORLAB | Performed by: STUDENT IN AN ORGANIZED HEALTH CARE EDUCATION/TRAINING PROGRAM

## 2025-04-29 PROCEDURE — 99254 IP/OBS CNSLTJ NEW/EST MOD 60: CPT | Performed by: INTERNAL MEDICINE

## 2025-04-29 PROCEDURE — 36415 COLL VENOUS BLD VENIPUNCTURE: CPT | Mod: PORLAB | Performed by: INTERNAL MEDICINE

## 2025-04-29 PROCEDURE — 2500000004 HC RX 250 GENERAL PHARMACY W/ HCPCS (ALT 636 FOR OP/ED): Mod: JZ | Performed by: STUDENT IN AN ORGANIZED HEALTH CARE EDUCATION/TRAINING PROGRAM

## 2025-04-29 PROCEDURE — S4991 NICOTINE PATCH NONLEGEND: HCPCS

## 2025-04-29 PROCEDURE — 86038 ANTINUCLEAR ANTIBODIES: CPT | Mod: PORLAB | Performed by: STUDENT IN AN ORGANIZED HEALTH CARE EDUCATION/TRAINING PROGRAM

## 2025-04-29 PROCEDURE — 86235 NUCLEAR ANTIGEN ANTIBODY: CPT | Performed by: STUDENT IN AN ORGANIZED HEALTH CARE EDUCATION/TRAINING PROGRAM

## 2025-04-29 PROCEDURE — 0003M LIVER DIS 10 ASSAYS W/NASH: CPT | Performed by: INTERNAL MEDICINE

## 2025-04-29 PROCEDURE — 86709 HEPATITIS A IGM ANTIBODY: CPT | Mod: PORLAB | Performed by: NURSE PRACTITIONER

## 2025-04-29 RX ORDER — SODIUM CHLORIDE 9 MG/ML
100 INJECTION, SOLUTION INTRAVENOUS CONTINUOUS
Status: ACTIVE | OUTPATIENT
Start: 2025-04-29 | End: 2025-04-30

## 2025-04-29 RX ORDER — ONDANSETRON 4 MG/1
4 TABLET, FILM COATED ORAL EVERY 8 HOURS PRN
Status: DISCONTINUED | OUTPATIENT
Start: 2025-04-29 | End: 2025-05-01 | Stop reason: HOSPADM

## 2025-04-29 RX ORDER — POLYETHYLENE GLYCOL 3350 17 G/17G
17 POWDER, FOR SOLUTION ORAL DAILY
Status: DISCONTINUED | OUTPATIENT
Start: 2025-04-29 | End: 2025-05-01 | Stop reason: HOSPADM

## 2025-04-29 RX ORDER — TALC
3 POWDER (GRAM) TOPICAL NIGHTLY PRN
Status: DISCONTINUED | OUTPATIENT
Start: 2025-04-29 | End: 2025-05-01 | Stop reason: HOSPADM

## 2025-04-29 RX ORDER — BUPRENORPHINE 300 MG/1
300 SOLUTION SUBCUTANEOUS
COMMUNITY

## 2025-04-29 RX ORDER — BISACODYL 5 MG
10 TABLET, DELAYED RELEASE (ENTERIC COATED) ORAL DAILY PRN
Status: DISCONTINUED | OUTPATIENT
Start: 2025-04-29 | End: 2025-05-01 | Stop reason: HOSPADM

## 2025-04-29 RX ORDER — ACETAMINOPHEN 500 MG
TABLET ORAL EVERY 6 HOURS PRN
COMMUNITY
End: 2025-05-01 | Stop reason: HOSPADM

## 2025-04-29 RX ORDER — IBUPROFEN 200 MG
TABLET ORAL
Status: COMPLETED
Start: 2025-04-29 | End: 2025-04-30

## 2025-04-29 RX ORDER — ONDANSETRON HYDROCHLORIDE 2 MG/ML
4 INJECTION, SOLUTION INTRAVENOUS EVERY 8 HOURS PRN
Status: DISCONTINUED | OUTPATIENT
Start: 2025-04-29 | End: 2025-05-01 | Stop reason: HOSPADM

## 2025-04-29 RX ORDER — LAMOTRIGINE 150 MG/1
150 TABLET ORAL DAILY
COMMUNITY

## 2025-04-29 RX ORDER — IBUPROFEN 200 MG
1 TABLET ORAL DAILY
Status: DISCONTINUED | OUTPATIENT
Start: 2025-04-29 | End: 2025-05-01 | Stop reason: HOSPADM

## 2025-04-29 RX ORDER — SIMVASTATIN 20 MG/1
20 TABLET, FILM COATED ORAL NIGHTLY
COMMUNITY

## 2025-04-29 RX ORDER — ARIPIPRAZOLE 15 MG/1
15 TABLET ORAL DAILY
COMMUNITY

## 2025-04-29 RX ORDER — GUAIFENESIN 600 MG/1
600 TABLET, EXTENDED RELEASE ORAL EVERY 12 HOURS PRN
Status: DISCONTINUED | OUTPATIENT
Start: 2025-04-29 | End: 2025-05-01 | Stop reason: HOSPADM

## 2025-04-29 RX ORDER — BISACODYL 10 MG/1
10 SUPPOSITORY RECTAL DAILY PRN
Status: DISCONTINUED | OUTPATIENT
Start: 2025-04-29 | End: 2025-05-01 | Stop reason: HOSPADM

## 2025-04-29 RX ADMIN — SODIUM CHLORIDE 100 ML/HR: 0.9 INJECTION, SOLUTION INTRAVENOUS at 09:57

## 2025-04-29 RX ADMIN — NICOTINE 1 PATCH: 21 PATCH, EXTENDED RELEASE TRANSDERMAL at 06:22

## 2025-04-29 RX ADMIN — ONDANSETRON 4 MG: 2 INJECTION INTRAMUSCULAR; INTRAVENOUS at 16:50

## 2025-04-29 SDOH — SOCIAL STABILITY: SOCIAL INSECURITY
WITHIN THE LAST YEAR, HAVE YOU BEEN KICKED, HIT, SLAPPED, OR OTHERWISE PHYSICALLY HURT BY YOUR PARTNER OR EX-PARTNER?: NO

## 2025-04-29 SDOH — ECONOMIC STABILITY: FOOD INSECURITY: WITHIN THE PAST 12 MONTHS, YOU WORRIED THAT YOUR FOOD WOULD RUN OUT BEFORE YOU GOT THE MONEY TO BUY MORE.: NEVER TRUE

## 2025-04-29 SDOH — ECONOMIC STABILITY: FOOD INSECURITY: WITHIN THE PAST 12 MONTHS, THE FOOD YOU BOUGHT JUST DIDN'T LAST AND YOU DIDN'T HAVE MONEY TO GET MORE.: NEVER TRUE

## 2025-04-29 SDOH — SOCIAL STABILITY: SOCIAL INSECURITY: WITHIN THE LAST YEAR, HAVE YOU BEEN HUMILIATED OR EMOTIONALLY ABUSED IN OTHER WAYS BY YOUR PARTNER OR EX-PARTNER?: NO

## 2025-04-29 SDOH — SOCIAL STABILITY: SOCIAL INSECURITY: WITHIN THE LAST YEAR, HAVE YOU BEEN AFRAID OF YOUR PARTNER OR EX-PARTNER?: NO

## 2025-04-29 SDOH — ECONOMIC STABILITY: INCOME INSECURITY: IN THE PAST 12 MONTHS HAS THE ELECTRIC, GAS, OIL, OR WATER COMPANY THREATENED TO SHUT OFF SERVICES IN YOUR HOME?: NO

## 2025-04-29 SDOH — SOCIAL STABILITY: SOCIAL INSECURITY: ABUSE: ADULT

## 2025-04-29 SDOH — SOCIAL STABILITY: SOCIAL INSECURITY
WITHIN THE LAST YEAR, HAVE YOU BEEN RAPED OR FORCED TO HAVE ANY KIND OF SEXUAL ACTIVITY BY YOUR PARTNER OR EX-PARTNER?: NO

## 2025-04-29 SDOH — SOCIAL STABILITY: SOCIAL INSECURITY: WERE YOU ABLE TO COMPLETE ALL THE BEHAVIORAL HEALTH SCREENINGS?: YES

## 2025-04-29 SDOH — SOCIAL STABILITY: SOCIAL INSECURITY: HAVE YOU HAD THOUGHTS OF HARMING ANYONE ELSE?: NO

## 2025-04-29 ASSESSMENT — LIFESTYLE VARIABLES
AUDIT-C TOTAL SCORE: 1
HOW MANY STANDARD DRINKS CONTAINING ALCOHOL DO YOU HAVE ON A TYPICAL DAY: 1 OR 2
HOW OFTEN DO YOU HAVE 6 OR MORE DRINKS ON ONE OCCASION: NEVER
AUDIT-C TOTAL SCORE: 1
HOW OFTEN DO YOU HAVE A DRINK CONTAINING ALCOHOL: MONTHLY OR LESS
SKIP TO QUESTIONS 9-10: 1

## 2025-04-29 ASSESSMENT — COGNITIVE AND FUNCTIONAL STATUS - GENERAL
DAILY ACTIVITIY SCORE: 24
PATIENT BASELINE BEDBOUND: NO
MOBILITY SCORE: 24

## 2025-04-29 ASSESSMENT — ENCOUNTER SYMPTOMS
WOUND: 0
STRIDOR: 0
DIFFICULTY URINATING: 0
CHEST TIGHTNESS: 0
APPETITE CHANGE: 0
DYSURIA: 0
COLOR CHANGE: 1
HEMATURIA: 0
FATIGUE: 0
MYALGIAS: 0
NUMBNESS: 0
WHEEZING: 0
FEVER: 0
DIAPHORESIS: 0
NERVOUS/ANXIOUS: 0
CHILLS: 0
RHINORRHEA: 0
BACK PAIN: 0
COUGH: 0
VOMITING: 0
SINUS PAIN: 0
ARTHRALGIAS: 0
AGITATION: 0
HEADACHES: 0
NAUSEA: 0
CONSTIPATION: 0
LIGHT-HEADEDNESS: 0
WEAKNESS: 0
ABDOMINAL DISTENTION: 0
SHORTNESS OF BREATH: 0
FLANK PAIN: 0
ABDOMINAL PAIN: 0
JOINT SWELLING: 0
PALPITATIONS: 0
APNEA: 0
DIARRHEA: 0
ACTIVITY CHANGE: 0
CONFUSION: 0
DECREASED CONCENTRATION: 0
FREQUENCY: 0
DIZZINESS: 0
SORE THROAT: 0

## 2025-04-29 ASSESSMENT — ACTIVITIES OF DAILY LIVING (ADL)
LACK_OF_TRANSPORTATION: NO
PATIENT'S MEMORY ADEQUATE TO SAFELY COMPLETE DAILY ACTIVITIES?: YES
BATHING: INDEPENDENT
WALKS IN HOME: INDEPENDENT
HEARING - RIGHT EAR: FUNCTIONAL
JUDGMENT_ADEQUATE_SAFELY_COMPLETE_DAILY_ACTIVITIES: YES
HEARING - LEFT EAR: FUNCTIONAL
ADEQUATE_TO_COMPLETE_ADL: YES
FEEDING YOURSELF: INDEPENDENT
DRESSING YOURSELF: INDEPENDENT
GROOMING: INDEPENDENT
TOILETING: INDEPENDENT

## 2025-04-29 ASSESSMENT — PATIENT HEALTH QUESTIONNAIRE - PHQ9
SUM OF ALL RESPONSES TO PHQ9 QUESTIONS 1 & 2: 0
1. LITTLE INTEREST OR PLEASURE IN DOING THINGS: NOT AT ALL
2. FEELING DOWN, DEPRESSED OR HOPELESS: NOT AT ALL

## 2025-04-29 ASSESSMENT — PAIN SCALES - GENERAL
PAINLEVEL_OUTOF10: 0 - NO PAIN
PAINLEVEL_OUTOF10: 0 - NO PAIN

## 2025-04-29 NOTE — H&P
[facility] - GENERAL MEDICINE HISTORY AND PHYSICAL    HISTORY OF PRESENT ILLNESS     History Obtained From (Primary Source): Patient  Collateral History (Secondary Sources): D/w ED physician    History Of Present Illness (HPI):  Elena Hernández is a 35 y.o. female with PMHx s/f hep C, bipolar disorder, depression, anxiety, PTSD, HLD presenting with hepatitis. Pt was admitted to an inpatient drug detox facility about three days ago in order to detox from methamphetamine. On the third day of her stay she became acutely jaundiced and constipated, but otherwise was asymptomatic. She was sent to the ER at [facility] who did a workup and planned to admit her for hepatitis. Staff had trouble getting an IV and she ultimately left AMA and came to [facility] because she would rather be treated here. Symptoms are still the same - jaundice, without abdominal pain or pruritus. She did have an episode of nausea and vomiting one per day the last two days. She was diagnosed with hep C on a blood test in the past, but has never had an episode of jaundice like this before. She was taking 1 Tylenol every 6 hours at the detox facility as scheduled. Pt does not drink alcohol. She does use crack cocaine on occasion but denies other drug use. No food or environmental exposures reported. Her home medications are mostly psych meds and have not been changed recently. No tremors, confusion, or altered mental status.    ED Course:   Vitals on presentation: T 36.5 °C (97.7 °F)  HR 82  /80  RR 20  O2 98 % None (Room air)  Labs:   CBC with WBC 15.7, Hgb 15.0, Plts 113.   CMP with glucose 118, Na 132, K 3.5, BUN 22, sCr 1.21, alk phos 231, ALT 2,212, AST 1,991, bilirubin 11.3  Lactate 1.0  Ammonia 75  Lipase 12  INR 1.2  UA 1+ bilirubin, 1+ urobilinogen, 250 leuk est  Imaging - CT a/p w contrast - Periportal edema with diffuse, marked gallbladder wall thickening, suggestive   of acute hepatitis.  There is small  volume low-density fluid in the pelvis.  No bowel obstruction.  There is large volume of stool within the cecum,   ascending colon and transverse colon.  agree with radiology interpretation(s):   Interventions: Zofran 4 mg IV X1, Admit to [facility] for hepatitis workup    12-point ROS reviewed and found to be negative aside from aforementioned positives in HPI and/or noted in dedicated ROS section below.     Decision made to admit the patient to the hospitalist service after evaluation of the patient, review of the above, and discussion with ED provider.     LABS AND IMAGING     I have personally reviewed the following labs from 04/29/25: CBC, CMP, UA, Lipase, Lactate, PT/INR, and Ammonia  I have personally reviewed the following imaging studies from 04/29/25: CT Abd/Pelvis, with my personal interpretations as documented in ED course above.   I have personally reviewed the patient's vitals on presentation to the ED and any/all changes through to time of admission (on 04/29/25).     ED Course (From ED Provider):  Diagnoses as of 04/29/25 0555   Transaminitis     Relevant Results  Results for orders placed or performed during the hospital encounter of 04/28/25 (from the past 24 hours)   CBC and Auto Differential   Result Value Ref Range    WBC 15.7 (H) 4.4 - 11.3 x10*3/uL    nRBC 0.1 (H) 0.0 - 0.0 /100 WBCs    RBC 4.81 4.00 - 5.20 x10*6/uL    Hemoglobin 15.0 12.0 - 16.0 g/dL    Hematocrit 41.8 36.0 - 46.0 %    MCV 87 80 - 100 fL    MCH 31.2 26.0 - 34.0 pg    MCHC 35.9 32.0 - 36.0 g/dL    RDW 13.0 11.5 - 14.5 %    Platelets 113 (L) 150 - 450 x10*3/uL    Immature Granulocytes %, Automated 0.2 0.0 - 0.9 %    Immature Granulocytes Absolute, Automated 0.03 0.00 - 0.70 x10*3/uL   Comprehensive metabolic panel   Result Value Ref Range    Glucose 118 (H) 74 - 99 mg/dL    Sodium 132 (L) 136 - 145 mmol/L    Potassium 3.5 3.5 - 5.3 mmol/L    Chloride 96 (L) 98 - 107 mmol/L    Bicarbonate 25 21 - 32 mmol/L    Anion Gap 15 10 - 20  mmol/L    Urea Nitrogen 22 6 - 23 mg/dL    Creatinine 1.21 (H) 0.50 - 1.05 mg/dL    eGFR 61 >60 mL/min/1.73m*2    Calcium 7.9 (L) 8.6 - 10.3 mg/dL    Albumin 3.7 3.4 - 5.0 g/dL    Alkaline Phosphatase 231 (H) 33 - 110 U/L    Total Protein 6.2 (L) 6.4 - 8.2 g/dL    AST 1,991 (H) 9 - 39 U/L    Bilirubin, Total 11.3 (H) 0.0 - 1.2 mg/dL    ALT 2,212 (H) 7 - 45 U/L   Lipase   Result Value Ref Range    Lipase 12 9 - 82 U/L   Ammonia   Result Value Ref Range    Ammonia 75 (H) 16 - 53 umol/L   Protime-INR   Result Value Ref Range    Protime 13.4 (H) 9.8 - 12.4 seconds    INR 1.2 (H) 0.9 - 1.1   Lactate   Result Value Ref Range    Lactate 1.0 0.4 - 2.0 mmol/L   Manual Differential   Result Value Ref Range    Neutrophils %, Manual 23.0 40.0 - 80.0 %    Lymphocytes %, Manual 58.0 13.0 - 44.0 %    Monocytes %, Manual 1.0 2.0 - 10.0 %    Eosinophils %, Manual 2.0 0.0 - 6.0 %    Basophils %, Manual 0.0 0.0 - 2.0 %    Atypical Lymphocytes %, Manual 16.0 0.0 - 2.0 %    Seg Neutrophils Absolute, Manual 3.61 1.20 - 7.00 x10*3/uL    Lymphocytes Absolute, Manual 9.11 (H) 1.20 - 4.80 x10*3/uL    Monocytes Absolute, Manual 0.16 0.10 - 1.00 x10*3/uL    Eosinophils Absolute, Manual 0.31 0.00 - 0.70 x10*3/uL    Basophils Absolute, Manual 0.00 0.00 - 0.10 x10*3/uL    Atypical Lymphs Absolute, Manual 2.51 (H) 0.00 - 0.50 x10*3/uL    Total Cells Counted 100     RBC Morphology No significant RBC morphology present    Urinalysis with Reflex Culture and Microscopic   Result Value Ref Range    Color, Urine Yellow Light-Yellow, Yellow, Dark-Yellow    Appearance, Urine Clear Clear    Specific Gravity, Urine 1.015 1.005 - 1.035    pH, Urine 6.5 5.0, 5.5, 6.0, 6.5, 7.0, 7.5, 8.0    Protein, Urine NEGATIVE NEGATIVE, 10 (TRACE), 20 (TRACE) mg/dL    Glucose, Urine Normal Normal mg/dL    Blood, Urine NEGATIVE NEGATIVE mg/dL    Ketones, Urine NEGATIVE NEGATIVE mg/dL    Bilirubin, Urine 1 (1+) (A) NEGATIVE mg/dL    Urobilinogen, Urine 2 (1+) (A) Normal  mg/dL    Nitrite, Urine NEGATIVE NEGATIVE    Leukocyte Esterase, Urine 250 Magno/uL (A) NEGATIVE   Microscopic Only, Urine   Result Value Ref Range    WBC, Urine 1-5 1-5, NONE /HPF    RBC, Urine 1-2 NONE, 1-2, 3-5 /HPF    Squamous Epithelial Cells, Urine 1-9 (SPARSE) Reference range not established. /HPF      Imaging  CT abdomen pelvis w IV contrast  Result Date: 4/28/2025  Periportal edema with diffuse, marked gallbladder wall thickening, suggestive of acute hepatitis.  There is small volume low-density fluid in the pelvis. No bowel obstruction.  There is large volume of stool within the cecum, ascending colon and transverse colon.      Cardiology, Vascular, and Other Imaging  CT abdomen pelvis w IV contrast  Result Date: 4/28/2025  EXAMINATION: CT OF THE ABDOMEN AND PELVIS WITH CONTRAST 4/28/2025 2:10 pm TECHNIQUE: CT of the abdomen and pelvis was performed with the administration of intravenous contrast. Multiplanar reformatted images are provided for review. Automated exposure control, iterative reconstruction, and/or weight based adjustment of the mA/kV was utilized to reduce the radiation dose to as low as reasonably achievable. COMPARISON: None. HISTORY: ORDERING SYSTEM PROVIDED HISTORY: sbo vs constipation TECHNOLOGIST PROVIDED HISTORY: Additional Contrast?->None Reason for exam:->sbo vs constipation Decision Support Exception - unselect if not a suspected or confirmed emergency medical condition->Emergency Medical Condition (MA) FINDINGS: Lower Chest: There is minimal dependent bibasilar atelectasis.  There is a trace right pleural effusion.  No basilar left pleural effusion.  No basilar pericardial effusion. Organs: Normal liver morphology.  There is periportal edema.  There is significant gallbladder wall thickening with only minimal pericholecystic inflammatory change.  No intra or extrahepatic biliary dilatation. Adrenals are normal.  Spleen is normal.  The pancreas is normal. The kidneys enhance normally  bilaterally without focal suspicious mass.  No renal calculi or hydronephrosis.  The bladder is normal GI/Bowel: The stomach is normal.  The small bowel and colon are nondilated. There is large stool burden in the cecum, ascending colon and transverse colon.  The appendix is normal. Pelvis: Normal uterus with intrauterine device in place.  No suspicious adnexal masses. Peritoneum/Retroperitoneum: The celiac artery, SMA and BARBARA are patent.  The renal arteries are patent bilaterally.  The portal vein, splenic vein and SMV are patent.  No abdominal or pelvic lymphadenopathy.  No pneumoperitoneum. There is small volume fluid in the pelvis which is low-density.  There is a 7 mm nodular density which lies near the posterior segment of the right hepatic lobe (series 2, image 68), with imaging features characteristic of a posterior parahepatic cyst. Bones/Soft Tissues: No acute osseous abnormality.    Periportal edema with diffuse, marked gallbladder wall thickening, suggestive of acute hepatitis.  There is small volume low-density fluid in the pelvis. No bowel obstruction.  There is large volume of stool within the cecum, ascending colon and transverse colon.         PAST HISTORIES AND ALLERGIES     Past Medical History  She has a past medical history of Cannabis use, unspecified, uncomplicated (01/17/2018), Cocaine abuse, uncomplicated (Multi) (01/17/2018), Cutaneous abscess of right upper limb (01/17/2018), Opioid dependence, uncomplicated (Multi) (02/09/2018), Personal history of other diseases of the digestive system (01/30/2018), Personal history of other mental and behavioral disorders (01/08/2018), Personal history of other mental and behavioral disorders (01/08/2018), Personal history of other mental and behavioral disorders (01/08/2018), Personal history of other mental and behavioral disorders (01/08/2018), Personal history of other mental and behavioral disorders (01/08/2018), and Personal history of other  "specified conditions (01/08/2018).    Surgical History  She has a past surgical history that includes Other surgical history (01/08/2018).     Social History  She reports that she has been smoking cigarettes. She has never used smokeless tobacco. She reports current alcohol use. She reports current drug use. Drugs: Methamphetamines, Marijuana, MDMA (ecstacy), and \"Crack\" cocaine.    Family History  Family History[1]    Allergies  Patient has no known allergies.    MEDICATIONS     Scheduled Medications:  Scheduled Medications[2]  Continuous Medications:  Continuous Medications[3]  PRN Medications:  PRN Medications[4]     REVIEW OF SYSTEMS     Review of Systems   Constitutional:  Negative for activity change, appetite change, chills, diaphoresis, fatigue and fever.   HENT:  Negative for congestion, ear pain, rhinorrhea, sinus pain and sore throat.    Respiratory:  Negative for apnea, cough, chest tightness, shortness of breath, wheezing and stridor.    Cardiovascular:  Negative for chest pain, palpitations and leg swelling.   Gastrointestinal:  Negative for abdominal distention, abdominal pain, constipation, diarrhea, nausea and vomiting.   Genitourinary:  Negative for difficulty urinating, dysuria, flank pain, frequency, hematuria and urgency.   Musculoskeletal:  Negative for arthralgias, back pain, gait problem, joint swelling and myalgias.   Skin:  Positive for color change. Negative for pallor, rash and wound.   Neurological:  Negative for dizziness, syncope, weakness, light-headedness, numbness and headaches.   Psychiatric/Behavioral:  Negative for agitation, behavioral problems, confusion and decreased concentration. The patient is not nervous/anxious.    All other systems reviewed and are negative.      OBJECTIVE     Last Recorded Vitals  /89   Pulse 82   Temp 36.5 °C (97.7 °F) (Tympanic)   Resp 18   Wt 70.8 kg (156 lb)   SpO2 (!) 93%      Physical Exam:  Vital signs and nursing notes reviewed. "   Constitutional: Pleasant and cooperative. Laying in bed in no acute distress. Conversant.   Skin: Warm and dry; no obvious lesions, rashes, pallor, Jaundice noted.  Eyes: EOMI. Icteric sclera  ENT: Mucous membranes moist; no obvious injury or deformity appreciated.   Head and Neck: Normocephalic, atraumatic. ROM preserved. Trachea midline. No appreciable JVD.   Respiratory: Nonlabored on RA. Lungs clear to auscultation bilaterally without obvious adventitious sounds. Chest rise is equal.  Cardiovascular: RRR. No gross murmur, gallop, or rub. Extremities are warm and well-perfused with good capillary refill (< 3 seconds). No chest wall tenderness.   GI: Abdomen soft, nontender, nondistended. No obvious organomegaly appreciated. Bowel sounds are present.  : No CVA tenderness.   MSK: No gross abnormalities appreciated. No limitations to AROM/PROM appreciated.   Extremities: No cyanosis, edema, or clubbing evident. Neurovascularly intact.   Neuro: A&Ox3. CN 2-12 grossly intact. Able to respond to questions appropriately and clearly. No acute focal neurologic deficits appreciated.  Psych: Appropriate mood and behavior.    ASSESSMENT AND PLAN   Assessment/Plan     35 y.o. female with PMHx s/f hep C, bipolar disorder, depression, anxiety, PTSD, HLD presenting with hepatitis.     Multiple lab abnormalities, likely acute hepatitis:  Labs show severe jaundice with total bili 11.3 and transaminitis with LFTs around 2000.  Ammonia is 75 - no signs of hepatic encephalopathy on exam.  Liver enzyme workup ordered - acetaminophen, alcohol, alpha 1, PAKO, anti-mitochondrial, anti-smooth, direct bilirubin, ceruloplasmin, ferritin, hep A/B/C antibodies, iron and TIBC, etc.  GI consulted  Trend CMP    Hx bipolar/depression/anxiety/PTSD - continue home medications once med rec updated.    Diet: Regular  DVT Prophylaxis: None needed per guidelines  Code Status: Full code  Case Discussed With: ED provider  Additional Sources Reviewed:  ED note day of admission; past ER notes    Anticipated Length of Stay (LOS): Patient will require one-to-two midnight stay for further evaluation and management, pending results of above and/or input from consultants.      DO Eleazar Syed dictation software was used to dictate this note and thus there may be minor errors in translation/transcription including garbled speech or misspellings. Please contact for clarification if needed.       [1] No family history on file.  [2] [3] [4]

## 2025-04-29 NOTE — PROGRESS NOTES
Elena Hernández is a 35 y.o. female admitted for Transaminitis. Pharmacy reviewed the patient's [id number] medications and allergies for accuracy.    The list below reflects the PTA list prior to pharmacy medication history. A summary a changes to the PTA medication list has been listed below. Please review each medication in order reconciliation for additional clarification and justification.    Source of information:  T2P    Medications added:  ACETAMINOPHEN 500MG 1 Q6 PRN  ARIPRAZOLE 15MG 1 every day  BUPRENORPHINE ER  300MG.1.5ML  INJECT EVERY MONTH  LAMOTRIGINE 150MG 1 every day  SIMVASTATIN 20MG 1 every day HS      Medications modified:    Medications to be removed:    Medications of concern:      None       Sumaya Mayen

## 2025-04-29 NOTE — CONSULTS
Reason For Consult  Abnormal LFTs    History Of Present Illness  Elena Hernández is a 35 y.o. female presenting with acute hepatitis.  She has a history of depression and bipolar disorder, anxiety, PTSD, hyperlipidemia recently discharged from inpatient drug  detox but unfortunately began using cocaine/crack, methamphetamine with MDMA and fentanyl.  She noted jaundice and was seen in a local hospital but signed out AMA and came to our institution.  She admitted to nausea and vomiting.  Also was constipated.  She had ea vague sense of abdominal discomfort.  She states she was taking a gram of Tylenol daily for quite some time but was getting Tylenol every 6 hours at her detox facility.  Denies any recent alcohol use and has not had a history of alcohol abuse in the past.  On admission through the ER her laboratories were notable for alk phos 231, ALT 2,212, AST 1,991, bilirubin 11.3, direct bilirubin 6 , lactate 1.0, Ammonia 75, Lipase 12,INR 1.2. WBC 15k. Hgb 15 G. Acetaminophen level was less than 10.  CAT scan showed periportal edema with diffuse marked gallbladder wall thickening suggestive of acute hepatitis.  A small volume of low-density fluid was also found in the pelvis and a large amount of stool in the right colon.  She has been admitted to the hospital for supportive care.  Her LFTs are trending downward.      Past Medical History  She has a past medical history of Cannabis use, unspecified, uncomplicated (01/17/2018), Cocaine abuse, uncomplicated (Multi) (01/17/2018), Cutaneous abscess of right upper limb (01/17/2018), Opioid dependence, uncomplicated (Multi) (02/09/2018), Personal history of other diseases of the digestive system (01/30/2018), Personal history of other mental and behavioral disorders (01/08/2018), Personal history of other mental and behavioral disorders (01/08/2018), Personal history of other mental and behavioral disorders (01/08/2018), Personal history of other mental and  "behavioral disorders (01/08/2018), Personal history of other mental and behavioral disorders (01/08/2018), and Personal history of other specified conditions (01/08/2018).    Surgical History  She has a past surgical history that includes Other surgical history (01/08/2018).     Social History  She reports that she has been smoking cigarettes. She has never used smokeless tobacco. She reports current alcohol use. She reports current drug use. Drugs: Methamphetamines, Marijuana, MDMA (ecstacy), and \"Crack\" cocaine.    Family History  Family History[1]     Allergies  Patient has no known allergies.    Review of Systems   as per the history of present illness     Physical Exam  Physical Exam:  Pleasant obese in no apparent distress  Alert and oriented x 3 NAD  HEENT: Normocephalic atraumatic.  Extraocular movements intact.  No scleral icterus.  Oropharynx slightly dry.  Clear no exudate.  Neck: Supple, full range of movement  CV: RRR, no murmurs appreciated  Lungs: CTA bilaterally  Abd: soft, tender RUQ, normal active bowel sounds, NR,ND   Ext: no lower extremity edema, cyanosis or clubbing  Skin: + jaundice , acne, no rashes       Last Recorded Vitals  Blood pressure 112/75, pulse 73, temperature 36.5 °C (97.7 °F), temperature source Tympanic, resp. rate 16, height 1.575 m (5' 2\"), weight 70.8 kg (156 lb), SpO2 97%.    Relevant Results  Results for orders placed or performed during the hospital encounter of 04/28/25 (from the past 24 hours)   CBC and Auto Differential   Result Value Ref Range    WBC 15.7 (H) 4.4 - 11.3 x10*3/uL    nRBC 0.1 (H) 0.0 - 0.0 /100 WBCs    RBC 4.81 4.00 - 5.20 x10*6/uL    Hemoglobin 15.0 12.0 - 16.0 g/dL    Hematocrit 41.8 36.0 - 46.0 %    MCV 87 80 - 100 fL    MCH 31.2 26.0 - 34.0 pg    MCHC 35.9 32.0 - 36.0 g/dL    RDW 13.0 11.5 - 14.5 %    Platelets 113 (L) 150 - 450 x10*3/uL    Immature Granulocytes %, Automated 0.2 0.0 - 0.9 %    Immature Granulocytes Absolute, Automated 0.03 0.00 - " 0.70 x10*3/uL   Comprehensive metabolic panel   Result Value Ref Range    Glucose 118 (H) 74 - 99 mg/dL    Sodium 132 (L) 136 - 145 mmol/L    Potassium 3.5 3.5 - 5.3 mmol/L    Chloride 96 (L) 98 - 107 mmol/L    Bicarbonate 25 21 - 32 mmol/L    Anion Gap 15 10 - 20 mmol/L    Urea Nitrogen 22 6 - 23 mg/dL    Creatinine 1.21 (H) 0.50 - 1.05 mg/dL    eGFR 61 >60 mL/min/1.73m*2    Calcium 7.9 (L) 8.6 - 10.3 mg/dL    Albumin 3.7 3.4 - 5.0 g/dL    Alkaline Phosphatase 231 (H) 33 - 110 U/L    Total Protein 6.2 (L) 6.4 - 8.2 g/dL    AST 1,991 (H) 9 - 39 U/L    Bilirubin, Total 11.3 (H) 0.0 - 1.2 mg/dL    ALT 2,212 (H) 7 - 45 U/L   Lipase   Result Value Ref Range    Lipase 12 9 - 82 U/L   Ammonia   Result Value Ref Range    Ammonia 75 (H) 16 - 53 umol/L   Protime-INR   Result Value Ref Range    Protime 13.4 (H) 9.8 - 12.4 seconds    INR 1.2 (H) 0.9 - 1.1   Lactate   Result Value Ref Range    Lactate 1.0 0.4 - 2.0 mmol/L   Pathologist Review-CBC Differential   Result Value Ref Range    Pathologist Review-CBC Differential       Absolute lymphocytosis (with reactive-type lymphocytes in peripheral blood smear) and mild thrombocytopenia, not otherwise diagnostic; no blasts seen in peripheral blood smear.    Note: Causes of peripheral lymphocytosis include (but are not limited to): reactive conditions including viral infections (EBV, CMV, acute HIV, and other viruses), drug reactions, stress reactions, and neoplastic proliferations (including CLL). Flow cytometry immunophenotyping of peripheral blood would be helpful to define a monoclonal population, if clinically indicated. Please correlate with the clinical and physical examination findings.   Manual Differential   Result Value Ref Range    Neutrophils %, Manual 23.0 40.0 - 80.0 %    Lymphocytes %, Manual 58.0 13.0 - 44.0 %    Monocytes %, Manual 1.0 2.0 - 10.0 %    Eosinophils %, Manual 2.0 0.0 - 6.0 %    Basophils %, Manual 0.0 0.0 - 2.0 %    Atypical Lymphocytes %, Manual  16.0 0.0 - 2.0 %    Seg Neutrophils Absolute, Manual 3.61 1.20 - 7.00 x10*3/uL    Lymphocytes Absolute, Manual 9.11 (H) 1.20 - 4.80 x10*3/uL    Monocytes Absolute, Manual 0.16 0.10 - 1.00 x10*3/uL    Eosinophils Absolute, Manual 0.31 0.00 - 0.70 x10*3/uL    Basophils Absolute, Manual 0.00 0.00 - 0.10 x10*3/uL    Atypical Lymphs Absolute, Manual 2.51 (H) 0.00 - 0.50 x10*3/uL    Total Cells Counted 100     RBC Morphology No significant RBC morphology present    Bilirubin, Direct   Result Value Ref Range    Bilirubin, Direct 6.1 (H) 0.0 - 0.3 mg/dL   Urinalysis with Reflex Culture and Microscopic   Result Value Ref Range    Color, Urine Yellow Light-Yellow, Yellow, Dark-Yellow    Appearance, Urine Clear Clear    Specific Gravity, Urine 1.015 1.005 - 1.035    pH, Urine 6.5 5.0, 5.5, 6.0, 6.5, 7.0, 7.5, 8.0    Protein, Urine NEGATIVE NEGATIVE, 10 (TRACE), 20 (TRACE) mg/dL    Glucose, Urine Normal Normal mg/dL    Blood, Urine NEGATIVE NEGATIVE mg/dL    Ketones, Urine NEGATIVE NEGATIVE mg/dL    Bilirubin, Urine 1 (1+) (A) NEGATIVE mg/dL    Urobilinogen, Urine 2 (1+) (A) Normal mg/dL    Nitrite, Urine NEGATIVE NEGATIVE    Leukocyte Esterase, Urine 250 Magno/uL (A) NEGATIVE   Extra Urine Gray Tube   Result Value Ref Range    Extra Tube Hold for add-ons.    Microscopic Only, Urine   Result Value Ref Range    WBC, Urine 1-5 1-5, NONE /HPF    RBC, Urine 1-2 NONE, 1-2, 3-5 /HPF    Squamous Epithelial Cells, Urine 1-9 (SPARSE) Reference range not established. /HPF   Acetaminophen   Result Value Ref Range    Acetaminophen <10.0 10.0 - 30.0 ug/mL   Alcohol   Result Value Ref Range    Alcohol <10 <=10 mg/dL   Ferritin   Result Value Ref Range    Ferritin 2,470 (H) 8 - 150 ng/mL   Iron and TIBC   Result Value Ref Range    Iron 181 (H) 35 - 150 ug/dL    UIBC <55 (L) 110 - 370 ug/dL    TIBC      % Saturation     Creatine Kinase   Result Value Ref Range    Creatine Kinase 85 0 - 215 U/L      CT abdomen pelvis w IV contrast  Result Date:  4/28/2025  EXAMINATION: CT OF THE ABDOMEN AND PELVIS WITH CONTRAST 4/28/2025 2:10 pm TECHNIQUE: CT of the abdomen and pelvis was performed with the administration of intravenous contrast. Multiplanar reformatted images are provided for review. Automated exposure control, iterative reconstruction, and/or weight based adjustment of the mA/kV was utilized to reduce the radiation dose to as low as reasonably achievable. COMPARISON: None. HISTORY: ORDERING SYSTEM PROVIDED HISTORY: sbo vs constipation TECHNOLOGIST PROVIDED HISTORY: Additional Contrast?->None Reason for exam:->sbo vs constipation Decision Support Exception - unselect if not a suspected or confirmed emergency medical condition->Emergency Medical Condition (MA) FINDINGS: Lower Chest: There is minimal dependent bibasilar atelectasis.  There is a trace right pleural effusion.  No basilar left pleural effusion.  No basilar pericardial effusion. Organs: Normal liver morphology.  There is periportal edema.  There is significant gallbladder wall thickening with only minimal pericholecystic inflammatory change.  No intra or extrahepatic biliary dilatation. Adrenals are normal.  Spleen is normal.  The pancreas is normal. The kidneys enhance normally bilaterally without focal suspicious mass.  No renal calculi or hydronephrosis.  The bladder is normal GI/Bowel: The stomach is normal.  The small bowel and colon are nondilated. There is large stool burden in the cecum, ascending colon and transverse colon.  The appendix is normal. Pelvis: Normal uterus with intrauterine device in place.  No suspicious adnexal masses. Peritoneum/Retroperitoneum: The celiac artery, SMA and BARBARA are patent.  The renal arteries are patent bilaterally.  The portal vein, splenic vein and SMV are patent.  No abdominal or pelvic lymphadenopathy.  No pneumoperitoneum. There is small volume fluid in the pelvis which is low-density.  There is a 7 mm nodular density which lies near the posterior  segment of the right hepatic lobe (series 2, image 68), with imaging features characteristic of a posterior parahepatic cyst. Bones/Soft Tissues: No acute osseous abnormality.    Periportal edema with diffuse, marked gallbladder wall thickening, suggestive of acute hepatitis.  There is small volume low-density fluid in the pelvis. No bowel obstruction.  There is large volume of stool within the cecum, ascending colon and transverse colon.  Ling's Discriminant Function - Control Prothrombin 12: 17.74 at 4/28/2025 11:01 PM  Calculated from:  Total Bilirubin: 11.3 mg/dL at 4/28/2025 11:01 PM  Prothrombin Time: 13.4 seconds at 4/28/2025 11:01 PM  MDF = (4.6 * (PT - Control PT)) + Bilirubin   MELD 3.0: 23 at 4/28/2025 11:01 PM  MELD-Na: 22 at 4/28/2025 11:01 PM  Calculated from:  Serum Creatinine: 1.21 mg/dL at 4/28/2025 11:01 PM  Serum Sodium: 132 mmol/L at 4/28/2025 11:01 PM  Total Bilirubin: 11.3 mg/dL at 4/28/2025 11:01 PM  Serum Albumin: 3.7 g/dL (Using max of 3.5 g/dL) at 4/28/2025 11:01 PM  INR(ratio): 1.2 at 4/28/2025 11:01 PM  Age at listing (hypothetical): 34 years  Sex: Female at 4/28/2025 11:01 PM        Assessment/Plan     Acute hepatitis; etiology indeterminate.  She does have risk factors of illicit drug use, daily 1 g of acetaminophen, and historically chronic hepatitis C.  The latter less likely to cause this degree of a mixed pattern of liver function test abnormalities and she may have spontaneously cleared this with a negative PCR in January 2018 without antiviral treatment.  This should be retested since she has had subsequent risk factors  Acetaminophen levels are normal but this would not exclude a component of acetaminophen induced liver injury.  Drug-induced liver injury from the mix of drugs that she has recently taken is what I believe is the inciting event here.  The extremely high ferritin may be in part due to  an acute phase reactant however this level of elevation would be atypical.   The additional high iron saturation (out of range of testing) also warrants an evaluation for hereditary hemochromatosis.  Secondary iron overload from chronic liver disease also could present this way.  Clinically this morning she is essentially asymptomatic.    Further testing for chronic or inherited liver diseases has been ordered.  I would include a hepatitis C PCR to address whether or not she has a new active infection.  Suggest trending her LFTs.  Daily INR, CRP.  Baseline noninvasive test of fibrosis in the form of a FibroSure blood test.      Thank you for this consultation.    I spent 30 minutes in the professional and overall care of this patient.      Marc Garvye MD         [1] No family history on file.

## 2025-04-29 NOTE — ED PROVIDER NOTES
HPI   Chief Complaint   Patient presents with    Vomiting    abnormal labs    Abdominal Pain    Constipation    eyes yellow      Pt states was at [facility] tonight and left when could not get IV  was told liver enzymes elevated   upper mid ABD pain  occasional vomiting  constipation  (last BM 3-4 weeks )  eyes yellow  Hx Hep-C  pt states was in rehab (3 days in ) and sent to ED       This is a 34-year-old  female that is presenting to the emergency room for elevated liver enzymes.  The patient recently completed a 3-day detox program for methamphetamines near [facility].  They were concerned because the patient's eyes were yellow.  She has a known history of hepatitis.  The patient was evaluated at [facility] emergency room.  The patient was told that she had transaminitis.  A CT of the abdomen pelvis was performed and showed periportal edema with diffuse marked gallbladder wall thickening suggestive of acute hepatitis was also noted the patient was constipated.  She states she has not had a bowel movement in 8 weeks.  She has been using stool softeners to with no relief of her symptoms.  She was also reportedly diagnosed with a UTI.  The plans were to admit the patient, however she left AMA.  She did not wish to be admitted at [facility].  Patient is from [facility].  She reported that they had difficulty starting an IV and refused further attempts and blood draws.  The patient is not having any chest pain, shortness of breath, dizziness, palpitations, paresthesias, or focal weakness.  She reports that she has been having some mild nausea.  Denies any alteration in her urine function.  She is not seeing any visible blood in her stool or her emesis.  She denies any fever or cold-like symptoms.      History provided by:  Patient   used: No                          Margaret Coma Scale Score: 15                  Patient History   Medical History[1]  Surgical  "History[2]  Family History[3]  Social History[4]    Physical Exam   Visit Vitals  /80 (BP Location: Left arm, Patient Position: Sitting)   Pulse 82   Temp 36.5 °C (97.7 °F) (Tympanic)   Resp 20   Ht 1.575 m (5' 2\")   Wt 70.8 kg (156 lb)   SpO2 98%   BMI 28.53 kg/m²   OB Status IUD   Smoking Status Every Day   BSA 1.76 m²      Physical Exam  Vitals and nursing note reviewed.   Constitutional:       Appearance: Normal appearance. She is normal weight.   HENT:      Head: Normocephalic and atraumatic.      Right Ear: Tympanic membrane normal.      Left Ear: Tympanic membrane normal.      Nose: Nose normal.      Mouth/Throat:      Mouth: Mucous membranes are moist.      Pharynx: Oropharynx is clear.   Eyes:      General: Scleral icterus present.      Extraocular Movements: Extraocular movements intact.      Conjunctiva/sclera: Conjunctivae normal.      Pupils: Pupils are equal, round, and reactive to light.   Cardiovascular:      Rate and Rhythm: Normal rate and regular rhythm.      Pulses: Normal pulses.   Pulmonary:      Effort: Pulmonary effort is normal.      Breath sounds: Normal breath sounds.   Abdominal:      General: Abdomen is flat. Bowel sounds are normal.      Palpations: Abdomen is soft.      Tenderness: There is generalized abdominal tenderness.   Genitourinary:     Comments: No CVA tenderness or pubic pain.  Musculoskeletal:         General: Normal range of motion.   Skin:     General: Skin is warm and dry.      Capillary Refill: Capillary refill takes less than 2 seconds.   Neurological:      General: No focal deficit present.      Mental Status: She is alert and oriented to person, place, and time.   Psychiatric:         Mood and Affect: Mood normal.         Judgment: Judgment normal.         No orders to display       Labs Reviewed   CBC WITH AUTO DIFFERENTIAL   COMPREHENSIVE METABOLIC PANEL   LIPASE   AMMONIA   PROTIME-INR   LACTATE   URINALYSIS WITH REFLEX CULTURE AND MICROSCOPIC    Narrative:  "    The following orders were created for panel order Urinalysis with Reflex Culture and Microscopic.  Procedure                               Abnormality         Status                     ---------                               -----------         ------                     Urinalysis with Reflex C...[[id number]]                                                 Extra Urine Gray Tube[[id number]]                                                         Please view results for these tests on the individual orders.   URINALYSIS WITH REFLEX CULTURE AND MICROSCOPIC   EXTRA URINE GRAY TUBE         ED Course & MDM   Diagnoses as of 04/29/25 0112   Transaminitis           Medical Decision Making  The patient was seen and evaluated with the attending physician, Dr. Chaudhari.  Patient is presenting to the emergency room with complaints of elevated liver enzymes with associated nausea and vomiting.  Differential diagnosis includes cirrhosis, acute liver failure, choledocholithiasis, cholecystitis, or other acute process.  Lab results and imaging results were reviewed from her previous visit earlier today.  Saline lock was established.  Laboratory studies were drawn with results as noted.  The patient was administered Zofran 4 mg IVP.  Laboratory studies showed an acute leukocytosis with a white count of 15.7.  CMP revealed a creatinine of 1.21, and alkaline phosphatase of 231, total protein of 6.2, AST of 1991, total bilirubin of 11.3, and ALT of 2212.  Patient did not have any acute lactic acidosis.  Ammonia was elevated at 75 the patient did not have any evidence of urinary tract infection.  Plan of care is for the patient to be admitted for further evaluation and treatment.  Care of the patient was endorsed to the attending physician           Your medication list      You have not been prescribed any medications.         Procedure       *This report was transcribed using voice recognition software.  Every effort was made to  "ensure accuracy; however, inadvertent computerized transcription errors may be present.*  Janie Dawn APRN-CNP  04/28/25           [1]   Past Medical History:  Diagnosis Date    Cannabis use, unspecified, uncomplicated 01/17/2018    Marijuana smoker, episodic    Cocaine abuse, uncomplicated (Multi) 01/17/2018    Cocaine abuse, episodic    Cutaneous abscess of right upper limb 01/17/2018    Abscess of arm, right    Opioid dependence, uncomplicated (Multi) 02/09/2018    Opioid type dependence, continuous use    Personal history of other diseases of the digestive system 01/30/2018    History of constipation    Personal history of other mental and behavioral disorders 01/08/2018    History of anxiety disorder    Personal history of other mental and behavioral disorders 01/08/2018    History of post traumatic stress disorder    Personal history of other mental and behavioral disorders 01/08/2018    History of depression    Personal history of other mental and behavioral disorders 01/08/2018    History of bipolar disorder    Personal history of other mental and behavioral disorders 01/08/2018    History of attention deficit hyperactivity disorder (ADHD)    Personal history of other specified conditions 01/08/2018    History of insomnia   [2]   Past Surgical History:  Procedure Laterality Date    OTHER SURGICAL HISTORY  01/08/2018    Contraceptives Intrauterine Devices (IUD)   [3] No family history on file.  [4]   Social History  Tobacco Use    Smoking status: Every Day     Types: Cigarettes    Smokeless tobacco: Never   Vaping Use    Vaping status: Never Used   Substance Use Topics    Alcohol use: Yes     Comment: occasional    Drug use: Yes     Types: Methamphetamines, Marijuana, MDMA (ecstacy), \"Crack\" cocaine        Janie Dawn, JAMAL-CNP  04/29/25 0112    "

## 2025-04-29 NOTE — PROGRESS NOTES
Mrs. Hernández was seen and evaluated. She denied fever and chills. No chest pain. No nausea or vomiting currently.   She reported right upper quadrant pain.  She is aware of her elevated liver enzymes but aware that the enzymes are improving,  We discussed management plans including gi consult.    Visit Vitals  /89   Pulse 82   Temp 36.5 °C (97.7 °F) (Tympanic)   Resp 18     Alert, NAD  EOMI  Oral mucosa is moist  Good chest rise and fall  Mild tenderness on palpation of upper quadrant of the abdomen  No extremity edema  No focal neuro deficit    Plans:  Noted DEXTER. Started on iv fluid  Continue aggressive bowel regimen, if no BM today will add lactulose.   Constipation is related to hx of opioid use  Will restart home medication.  Concern that elevated liver enzyme could be side effect of home meds. She is on statin. Will hold zocor    May need psych input for Lamictal and abilify.  Added CK  Hepatitis viral panel report is pending

## 2025-04-30 LAB
ALBUMIN SERPL BCP-MCNC: 3.1 G/DL (ref 3.4–5)
ALBUMIN SERPL BCP-MCNC: 3.5 G/DL (ref 3.4–5)
ALP SERPL-CCNC: 226 U/L (ref 33–110)
ALP SERPL-CCNC: 253 U/L (ref 33–110)
ALT SERPL W P-5'-P-CCNC: 1197 U/L (ref 7–45)
ALT SERPL W P-5'-P-CCNC: 1268 U/L (ref 7–45)
ANA PATTERN: ABNORMAL
ANA SER QL HEP2 SUBST: POSITIVE
ANA TITR SER IF: ABNORMAL {TITER}
ANION GAP SERPL CALC-SCNC: 11 MMOL/L (ref 10–20)
ANION GAP SERPL CALC-SCNC: 14 MMOL/L (ref 10–20)
AST SERPL W P-5'-P-CCNC: 618 U/L (ref 9–39)
AST SERPL W P-5'-P-CCNC: 625 U/L (ref 9–39)
BILIRUB SERPL-MCNC: 10.9 MG/DL (ref 0–1.2)
BILIRUB SERPL-MCNC: 11.7 MG/DL (ref 0–1.2)
BUN SERPL-MCNC: 10 MG/DL (ref 6–23)
BUN SERPL-MCNC: 11 MG/DL (ref 6–23)
CALCIUM SERPL-MCNC: 8.3 MG/DL (ref 8.6–10.3)
CALCIUM SERPL-MCNC: 8.7 MG/DL (ref 8.6–10.3)
CENTROMERE B AB SER-ACNC: <0.2 AI
CHLORIDE SERPL-SCNC: 101 MMOL/L (ref 98–107)
CHLORIDE SERPL-SCNC: 103 MMOL/L (ref 98–107)
CHROMATIN AB SERPL-ACNC: <0.2 AI
CO2 SERPL-SCNC: 26 MMOL/L (ref 21–32)
CO2 SERPL-SCNC: 27 MMOL/L (ref 21–32)
CREAT SERPL-MCNC: 0.78 MG/DL (ref 0.5–1.05)
CREAT SERPL-MCNC: 0.94 MG/DL (ref 0.5–1.05)
DSDNA AB SER-ACNC: 4 IU/ML
EGFRCR SERPLBLD CKD-EPI 2021: 82 ML/MIN/1.73M*2
EGFRCR SERPLBLD CKD-EPI 2021: >90 ML/MIN/1.73M*2
ENA JO1 AB SER QL IA: <0.2 AI
ENA RNP AB SER IA-ACNC: 0.2 AI
ENA SCL70 AB SER QL IA: <0.2 AI
ENA SM AB SER IA-ACNC: <0.2 AI
ENA SM+RNP AB SER QL IA: <0.2 AI
ENA SS-A AB SER IA-ACNC: <0.2 AI
ENA SS-B AB SER IA-ACNC: 0.2 AI
ERYTHROCYTE [DISTWIDTH] IN BLOOD BY AUTOMATED COUNT: 13.3 % (ref 11.5–14.5)
GLUCOSE SERPL-MCNC: 102 MG/DL (ref 74–99)
GLUCOSE SERPL-MCNC: 168 MG/DL (ref 74–99)
HAV IGM SER QL: NONREACTIVE
HCT VFR BLD AUTO: 37.2 % (ref 36–46)
HCV RNA SERPL NAA+PROBE-ACNC: ABNORMAL IU/ML (ref ?–15)
HCV RNA SERPL NAA+PROBE-ACNC: DETECTED K[IU]/ML
HCV RNA SERPL NAA+PROBE-LOG IU: 4.39 LOG IU/ML
HGB BLD-MCNC: 13.2 G/DL (ref 12–16)
INR PPP: 1.1 (ref 0.9–1.1)
MAGNESIUM SERPL-MCNC: 1.84 MG/DL (ref 1.6–2.4)
MCH RBC QN AUTO: 31.6 PG (ref 26–34)
MCHC RBC AUTO-ENTMCNC: 35.5 G/DL (ref 32–36)
MCV RBC AUTO: 89 FL (ref 80–100)
MITOCHONDRIA AB SER QL IF: NEGATIVE
NRBC BLD-RTO: 0.2 /100 WBCS (ref 0–0)
PLATELET # BLD AUTO: 120 X10*3/UL (ref 150–450)
POTASSIUM SERPL-SCNC: 3.2 MMOL/L (ref 3.5–5.3)
POTASSIUM SERPL-SCNC: 3.5 MMOL/L (ref 3.5–5.3)
PROT SERPL-MCNC: 5.7 G/DL (ref 6.4–8.2)
PROT SERPL-MCNC: 6.4 G/DL (ref 6.4–8.2)
PROTHROMBIN TIME: 11.8 SECONDS (ref 9.8–12.4)
RBC # BLD AUTO: 4.18 X10*6/UL (ref 4–5.2)
RIBOSOMAL P AB SER-ACNC: <0.2 AI
SMOOTH MUSCLE AB SER QL IF: ABNORMAL
SODIUM SERPL-SCNC: 136 MMOL/L (ref 136–145)
SODIUM SERPL-SCNC: 139 MMOL/L (ref 136–145)
WBC # BLD AUTO: 8.9 X10*3/UL (ref 4.4–11.3)

## 2025-04-30 PROCEDURE — 2500000004 HC RX 250 GENERAL PHARMACY W/ HCPCS (ALT 636 FOR OP/ED): Mod: JZ | Performed by: STUDENT IN AN ORGANIZED HEALTH CARE EDUCATION/TRAINING PROGRAM

## 2025-04-30 PROCEDURE — 2500000001 HC RX 250 WO HCPCS SELF ADMINISTERED DRUGS (ALT 637 FOR MEDICARE OP): Performed by: STUDENT IN AN ORGANIZED HEALTH CARE EDUCATION/TRAINING PROGRAM

## 2025-04-30 PROCEDURE — 99232 SBSQ HOSP IP/OBS MODERATE 35: CPT | Performed by: NURSE PRACTITIONER

## 2025-04-30 PROCEDURE — 85610 PROTHROMBIN TIME: CPT | Performed by: NURSE PRACTITIONER

## 2025-04-30 PROCEDURE — 83735 ASSAY OF MAGNESIUM: CPT | Performed by: INTERNAL MEDICINE

## 2025-04-30 PROCEDURE — 80053 COMPREHEN METABOLIC PANEL: CPT | Performed by: STUDENT IN AN ORGANIZED HEALTH CARE EDUCATION/TRAINING PROGRAM

## 2025-04-30 PROCEDURE — 80053 COMPREHEN METABOLIC PANEL: CPT | Performed by: INTERNAL MEDICINE

## 2025-04-30 PROCEDURE — 36415 COLL VENOUS BLD VENIPUNCTURE: CPT | Performed by: STUDENT IN AN ORGANIZED HEALTH CARE EDUCATION/TRAINING PROGRAM

## 2025-04-30 PROCEDURE — 85027 COMPLETE CBC AUTOMATED: CPT | Performed by: STUDENT IN AN ORGANIZED HEALTH CARE EDUCATION/TRAINING PROGRAM

## 2025-04-30 PROCEDURE — 2500000001 HC RX 250 WO HCPCS SELF ADMINISTERED DRUGS (ALT 637 FOR MEDICARE OP): Performed by: INTERNAL MEDICINE

## 2025-04-30 PROCEDURE — 2500000001 HC RX 250 WO HCPCS SELF ADMINISTERED DRUGS (ALT 637 FOR MEDICARE OP): Performed by: REGISTERED NURSE

## 2025-04-30 PROCEDURE — 2500000002 HC RX 250 W HCPCS SELF ADMINISTERED DRUGS (ALT 637 FOR MEDICARE OP, ALT 636 FOR OP/ED): Performed by: EMERGENCY MEDICINE

## 2025-04-30 PROCEDURE — 2500000002 HC RX 250 W HCPCS SELF ADMINISTERED DRUGS (ALT 637 FOR MEDICARE OP, ALT 636 FOR OP/ED): Performed by: REGISTERED NURSE

## 2025-04-30 PROCEDURE — 99233 SBSQ HOSP IP/OBS HIGH 50: CPT | Performed by: INTERNAL MEDICINE

## 2025-04-30 PROCEDURE — 36415 COLL VENOUS BLD VENIPUNCTURE: CPT | Performed by: NURSE PRACTITIONER

## 2025-04-30 PROCEDURE — 1210000001 HC SEMI-PRIVATE ROOM DAILY

## 2025-04-30 PROCEDURE — S4991 NICOTINE PATCH NONLEGEND: HCPCS | Performed by: EMERGENCY MEDICINE

## 2025-04-30 RX ORDER — IBUPROFEN 400 MG/1
800 TABLET ORAL EVERY 8 HOURS PRN
Status: DISCONTINUED | OUTPATIENT
Start: 2025-04-30 | End: 2025-05-01 | Stop reason: HOSPADM

## 2025-04-30 RX ORDER — LACTULOSE 10 G/15ML
20 SOLUTION ORAL DAILY
Status: DISCONTINUED | OUTPATIENT
Start: 2025-04-30 | End: 2025-05-01 | Stop reason: HOSPADM

## 2025-04-30 RX ORDER — POTASSIUM CHLORIDE 20 MEQ/1
40 TABLET, EXTENDED RELEASE ORAL 2 TIMES DAILY
Status: DISCONTINUED | OUTPATIENT
Start: 2025-04-30 | End: 2025-05-01 | Stop reason: HOSPADM

## 2025-04-30 RX ORDER — POTASSIUM CHLORIDE 1.5 G/1.58G
40 POWDER, FOR SOLUTION ORAL 2 TIMES DAILY
Status: DISCONTINUED | OUTPATIENT
Start: 2025-04-30 | End: 2025-04-30

## 2025-04-30 RX ADMIN — POLYETHYLENE GLYCOL 3350 17 G: 17 POWDER, FOR SOLUTION ORAL at 08:02

## 2025-04-30 RX ADMIN — LAMOTRIGINE 150 MG: 100 TABLET ORAL at 08:15

## 2025-04-30 RX ADMIN — ONDANSETRON 4 MG: 2 INJECTION INTRAMUSCULAR; INTRAVENOUS at 01:44

## 2025-04-30 RX ADMIN — ARIPIPRAZOLE 15 MG: 10 TABLET ORAL at 08:15

## 2025-04-30 RX ADMIN — POTASSIUM CHLORIDE 40 MEQ: 1500 TABLET, EXTENDED RELEASE ORAL at 22:19

## 2025-04-30 RX ADMIN — NICOTINE 1 PATCH: 21 PATCH, EXTENDED RELEASE TRANSDERMAL at 08:02

## 2025-04-30 RX ADMIN — IBUPROFEN 800 MG: 400 TABLET, FILM COATED ORAL at 02:31

## 2025-04-30 RX ADMIN — LACTULOSE 20 G: 10 SOLUTION ORAL at 08:48

## 2025-04-30 RX ADMIN — BISACODYL 10 MG: 10 SUPPOSITORY RECTAL at 01:54

## 2025-04-30 RX ADMIN — POTASSIUM CHLORIDE 40 MEQ: 1.5 POWDER, FOR SOLUTION ORAL at 08:14

## 2025-04-30 ASSESSMENT — COGNITIVE AND FUNCTIONAL STATUS - GENERAL
DAILY ACTIVITIY SCORE: 24
DAILY ACTIVITIY SCORE: 24
MOBILITY SCORE: 24
MOBILITY SCORE: 24

## 2025-04-30 ASSESSMENT — PAIN - FUNCTIONAL ASSESSMENT
PAIN_FUNCTIONAL_ASSESSMENT: 0-10
PAIN_FUNCTIONAL_ASSESSMENT: 0-10

## 2025-04-30 ASSESSMENT — PAIN SCALES - GENERAL
PAINLEVEL_OUTOF10: 0 - NO PAIN
PAINLEVEL_OUTOF10: 0 - NO PAIN
PAINLEVEL_OUTOF10: 8

## 2025-04-30 ASSESSMENT — ACTIVITIES OF DAILY LIVING (ADL): LACK_OF_TRANSPORTATION: NO

## 2025-04-30 NOTE — PROGRESS NOTES
"  [facility] Gastroenterology Progress Note    ASSESSMENT and PLAN:       Elena Hernández is a 35 y.o. female with a significant past medical history of depression and bipolar disorder, anxiety, PTSD, hyperlipidemia recently discharged from inpatient drug detox but unfortunately began using cocaine/crack, methamphetamine with MDMA and fentanyl who presented with jaundice and constipation. GI was consulted for \"hepatitis\".       Elevated LFTs   Patient presented with jaundice, nausea, constipation, and abdominal pain. She was at a detox facility prior to this admission and was getting tylenol Q6 hours, though acetaminophen level on admission was <10. Transaminases and bilirubin were markedly elevated on presentation, but are now trending down. AST 1991->625, ALT 2212->1197, bilirubin 11.3->10.9, ->226. Imaging showed findings suggestive of acute hepatitis in addition to large volume stool in the colon. The iron saturation was out of range of testing. Liver labs consistent with likely prior exposures to hepatitis A and hepatitis B. Patient likely has DILI from mix of drugs recently.   - daily CMP and INR while admitted   - pending HCV RNA, Hep A IgM, hemochromatosis mutation, and fibrosure   - avoid hepatotoxic medications     2. Constipation   Continue bowel regimen with miralax 17g daily. Reports BM this morning       Discussed with Dr. Mare Hartman, [facility]        SUBJECTIVE and INTERVAL HISTORY:       Elena Hernández is a 35 y.o. female with a significant past medical history of depression and bipolar disorder, anxiety, PTSD, hyperlipidemia recently discharged from inpatient drug detox but unfortunately began using cocaine/crack, methamphetamine with MDMA and fentanyl who presented with jaundice and constipation. GI was consulted for \"hepatitis\".    HPI:  Patient seen and examined. She is feeling better but still has some abdominal pain. Had a BM today and nausea has improved. No vomiting, " "melena, hematemesis. Remains jaundiced.         Review of systems:     Patient denies any heartburn/GERD, N/V, dysphagia, odynophagia, abdominal pain, diarrhea, constipation, hematemesis, hematochezia, melena, or weight loss.    I performed a complete 10 point review of systems and it is negative except as noted in HPI or above.    All other systems have been reviewed and are negative.          OBJECTIVE:       Last Recorded Vitals:  Vitals:    04/29/25 2100 04/30/25 0100 04/30/25 0433 04/30/25 0948   BP: 110/72 (!) 147/97 116/77 143/85   BP Location: Left arm Left arm Left arm Left arm   Patient Position: Lying Sitting Lying Lying   Pulse: 79 88 81 89   Resp: 18 18 17 16   Temp: 36.1 °C (96.9 °F) 35.8 °C (96.5 °F) 36.4 °C (97.6 °F) 36.2 °C (97.2 °F)   TempSrc: Temporal Temporal Temporal Temporal   SpO2: 95% 99% 95% 96%   Weight:       Height:         /85 (BP Location: Left arm, Patient Position: Lying)   Pulse 89   Temp 36.2 °C (97.2 °F) (Temporal)   Resp 16   Ht 1.575 m (5' 2\")   Wt 70.8 kg (156 lb)   SpO2 96%   BMI 28.53 kg/m²      Physical Exam:    Physical Exam  Constitutional:       General: She is not in acute distress.  HENT:      Mouth/Throat:      Mouth: Mucous membranes are moist.   Eyes:      General: Scleral icterus present.      Conjunctiva/sclera: Conjunctivae normal.      Pupils: Pupils are equal, round, and reactive to light.   Cardiovascular:      Rate and Rhythm: Normal rate and regular rhythm.      Heart sounds: No murmur heard.  Pulmonary:      Effort: Pulmonary effort is normal.      Breath sounds: Normal breath sounds.   Abdominal:      General: Bowel sounds are normal. There is no distension.      Palpations: Abdomen is soft.      Tenderness: There is abdominal tenderness. There is no guarding.   Skin:     General: Skin is warm and dry.      Coloration: Skin is jaundiced.   Neurological:      Mental Status: She is alert and oriented to person, place, and time.   Psychiatric:    "      Mood and Affect: Mood normal.         Behavior: Behavior normal.           Inpatient Medications:  Scheduled Medications[1]  PRN Medications[2]    Outpatient Medications:  Prior to Admission medications    Medication Sig Start Date End Date Taking? Authorizing Provider   ARIPiprazole (Abilify) 15 mg tablet Take 1 tablet (15 mg) by mouth once daily.   Yes Historical Provider, MD   buprenorphine ER (Sublocade) 300 mg/1.5mL injection Inject 1.5 mL (1 each) under the skin every month to absorb continually. NEXT INJECTION DUE 5/10/25   Yes Historical Provider, MD   lamoTRIgine (LaMICtal) 150 mg tablet Take 1 tablet (150 mg) by mouth once daily.   Yes Historical Provider, MD   simvastatin (Zocor) 20 mg tablet Take 1 tablet (20 mg) by mouth once daily at bedtime.   Yes Historical Provider, MD   acetaminophen (Tylenol) 500 mg tablet Take by mouth every 6 hours if needed for mild pain (1 - 3).    Historical Provider, MD       LABS AND IMAGING:     Labs:  Recent labs reviewed in the EMR.    Lab Results   Component Value Date    WBC 8.9 04/30/2025    HGB 13.2 04/30/2025    HGB 15.0 04/28/2025    MCV 89 04/30/2025     (L) 04/30/2025     (L) 04/28/2025    IRON 181 (H) 04/29/2025    TIBC  04/29/2025      Comment:      One or more of the analytes used in this calculation is outside of the analytical measurement range.      IRONSAT  04/29/2025      Comment:      One or more analytes used in this calculation is outside of the analytical measurement range. Calculation cannot be performed.    FERRITIN 2,470 (H) 04/29/2025       Lab Results   Component Value Date     04/30/2025    K 3.5 04/30/2025     04/30/2025    BUN 11 04/30/2025    CREATININE 0.94 04/30/2025    CREATININE 0.78 04/30/2025       Lab Results   Component Value Date    BILITOT 11.7 (H) 04/30/2025    BILITOT 10.9 (H) 04/30/2025    BILITOT 11.3 (H) 04/28/2025    BILIDIR 6.1 (H) 04/28/2025    ALKPHOS 253 (H) 04/30/2025    ALKPHOS 226 (H)  "04/30/2025    ALKPHOS 231 (H) 04/28/2025     (H) 04/30/2025     (H) 04/30/2025    AST 1,991 (H) 04/28/2025    ALT 1,268 (H) 04/30/2025    ALT 1,197 (H) 04/30/2025    ALT 2,212 (H) 04/28/2025    LIPASE 12 04/28/2025       No results found for: \"CRP\", \"CALPS\"      Imaging:  Recent imaging results reviewed.             [1] ARIPiprazole, 15 mg, oral, Daily  lactulose, 20 g, oral, Daily  nicotine, 1 patch, transdermal, Daily  polyethylene glycol, 17 g, oral, Daily  potassium chloride, 40 mEq, oral, BID  [2] PRN medications: bisacodyl, bisacodyl, guaiFENesin, ibuprofen, melatonin, ondansetron **OR** ondansetron    "

## 2025-04-30 NOTE — CARE PLAN
Problem: Pain - Adult  Goal: Verbalizes/displays adequate comfort level or baseline comfort level  Outcome: Progressing     Problem: Safety - Adult  Goal: Free from fall injury  Outcome: Progressing     Problem: Discharge Planning  Goal: Discharge to home or other facility with appropriate resources  Outcome: Progressing     Problem: Chronic Conditions and Co-morbidities  Goal: Patient's chronic conditions and co-morbidity symptoms are monitored and maintained or improved  Outcome: Progressing     Problem: Nutrition  Goal: Nutrient intake appropriate for maintaining nutritional needs  Outcome: Progressing     Problem: Pain  Goal: Takes deep breaths with improved pain control throughout the shift  Outcome: Progressing  Goal: Turns in bed with improved pain control throughout the shift  Outcome: Progressing  Goal: Walks with improved pain control throughout the shift  Outcome: Progressing  Goal: Performs ADL's with improved pain control throughout shift  Outcome: Progressing  Goal: Participates in PT with improved pain control throughout the shift  Outcome: Progressing  Goal: Free from opioid side effects throughout the shift  Outcome: Progressing  Goal: Free from acute confusion related to pain meds throughout the shift  Outcome: Progressing     The clinical goals for the shift include maintain patient safety

## 2025-04-30 NOTE — NURSING NOTE
"Patient reports no BM in \"weeks.\" CT shows no ileus but large amounts of stool in cecum. Patient reports abdominal pain 8/10 and nausea. Shortly after zofran given, patient had large emesis. Dulcolax suppository provided for patient self administration. Patient is Aox4 and independent.  "

## 2025-04-30 NOTE — ASSESSMENT & PLAN NOTE
Slight improvement.  Jaundice is improving as well.      Hypokalemia: Replaced with oral K      DEXTER: resolved with IV fluid. Creatinine is now 0.7 from 1.2    Severe Constipation: added lactulose.   Continue to monitor    Hx of IV drug Abuse: on Sublocade monthly    Hx of Bipolar: resume home medication.  Ordered lamictal which patient received this morning. However, since unsure if this is contributing to liver failure, I will discontinued and recheck CMP in am.

## 2025-04-30 NOTE — PROGRESS NOTES
Elena Hernández is a 35 y.o. female on day 1 of admission presenting with Transaminitis.      Subjective   Mrs. Hernández was seen and evaluated. She denied fever and chills. No chest pain. She reported nausea and vomiting and attributed it to constipation. She noted that she's not had BM in the last 7 days. She noted that she took stool softener yesterday, but no BM yet.   Objective     Last Recorded Vitals  /77 (BP Location: Left arm, Patient Position: Lying)   Pulse 81   Temp 36.4 °C (97.6 °F) (Temporal)   Resp 17   Wt 70.8 kg (156 lb)   SpO2 95%   Intake/Output last 3 Shifts:    Intake/Output Summary (Last 24 hours) at 4/30/2025 0800  Last data filed at 4/29/2025 2247  Gross per 24 hour   Intake 1100 ml   Output --   Net 1100 ml       Admission Weight  Weight: 70.8 kg (156 lb) (04/28/25 1958)    Daily Weight  04/28/25 : 70.8 kg (156 lb)    Image Results  Electrocardiogram 12 Lead  Sinus or ectopic atrial rhythm  RSR' in V1 or V2, right VCD or RVH  Borderline prolonged QT interval    Confirmed by ISADORA LANE MD ([id number]) on 8/5/2020 3:46:05 PM  Electrocardiogram 12 Lead  Sinus tachycardia  RSR' in V1 or V2, probably normal variant  Borderline T wave abnormalities  Borderline prolonged QT interval  Baseline wander in lead(s) III    Confirmed by FRED STALLINGS MD ([id number]) on 6/22/2021 5:28:43 PM  Electrocardiogram 12 Lead  Sinus rhythm  RSR' in V1 or V2, right VCD or RVH  Baseline wander in lead(s) V1    Confirmed by FRED STALLINGS MD ([id number]) on 7/18/2021 9:26:21 AM      Physical Exam  Constitutional:       Appearance: Normal appearance.   HENT:      Head: Normocephalic and atraumatic.      Nose: Nose normal.      Mouth/Throat:      Mouth: Mucous membranes are moist.   Eyes:      Extraocular Movements: Extraocular movements intact.      Pupils: Pupils are equal, round, and reactive to light.   Cardiovascular:      Rate and Rhythm: Normal rate and regular rhythm.      Pulses: Normal pulses.      Heart  sounds: Normal heart sounds.   Pulmonary:      Effort: Pulmonary effort is normal.      Breath sounds: Normal breath sounds.   Abdominal:      General: Bowel sounds are normal.      Palpations: Abdomen is soft.   Musculoskeletal:      Cervical back: Neck supple.   Skin:     General: Skin is warm.   Neurological:      General: No focal deficit present.      Mental Status: She is alert and oriented to person, place, and time.   Psychiatric:         Mood and Affect: Mood normal.         Behavior: Behavior normal.         Relevant Results             Assessment & Plan  Transaminitis  Etiology is still unclear. ( ? Possibly drug induced, autoimmune, shock liver)  Work up still in progress   Hepatitis Panel showed Positive Hepatitis B surface Antibody and Positive Hepatitis A antibody.   Hepatitis C AB is positive as well  Checking Hep C RNA quantitative.  Appreciate GI recommendation  Continue hold zocor  CK is normal   LFT has been trending down compare to admission.   Possible discharge if okay with GI  Quick improvement of symptoms suggest more acute event.)  Discharge if AST, ALT continues to decrease and go below 500.   Elevated bilirubin  Slight improvement.  Jaundice is improving as well.      Hypokalemia: Replaced with oral K      DEXTER: resolved with IV fluid. Creatinine is now 0.7 from 1.2    Severe Constipation: added lactulose.   Continue to monitor    Hx of IV drug Abuse: on Sublocade monthly    Hx of Bipolar: resume home medication.  Ordered lamictal which patient received this morning. However, since unsure if this is contributing to liver failure, I will discontinued and recheck CMP in am.       Osman Rome MD

## 2025-04-30 NOTE — CARE PLAN
The patient's goals for the shift include  rest    The clinical goals for the shift include no vomiting    Problem: Pain - Adult  Goal: Verbalizes/displays adequate comfort level or baseline comfort level  Outcome: Progressing     Problem: Safety - Adult  Goal: Free from fall injury  Outcome: Progressing     Problem: Discharge Planning  Goal: Discharge to home or other facility with appropriate resources  Outcome: Progressing     Problem: Chronic Conditions and Co-morbidities  Goal: Patient's chronic conditions and co-morbidity symptoms are monitored and maintained or improved  Outcome: Progressing     Problem: Nutrition  Goal: Nutrient intake appropriate for maintaining nutritional needs  Outcome: Progressing

## 2025-04-30 NOTE — PROGRESS NOTES
"   04/30/25 1253   Discharge Planning   Living Arrangements Spouse/significant other   Support Systems Spouse/significant other;Children   Assistance Needed independnet in bathing, dressing, cooking, cleaning. [place] [address]. Denies falls or use of assistive devices.   Type of Residence Private residence   Number of Stairs to Enter Residence 3   Number of Stairs Within Residence 10  (upstairs and to basement, manage well.)   Do you have animals or pets at home? Yes   Type of Animals or Pets 1 dog   Who is requesting discharge planning? Provider   Home or Post Acute Services None   Expected Discharge Disposition Home   Does the patient need discharge transport arranged? No   Financial Resource Strain   How hard is it for you to pay for the very basics like food, housing, medical care, and heating? Not hard   Housing Stability   In the last 12 months, was there a time when you were not able to pay the mortgage or rent on time? N   In the past 12 months, how many times have you moved where you were living? 0   At any time in the past 12 months, were you homeless or living in a shelter (including now)? N   Transportation Needs   In the past 12 months, has lack of transportation kept you from medical appointments or from getting medications? no   In the past 12 months, has lack of transportation kept you from meetings, work, or from getting things needed for daily living? No   Stroke Family Assessment   Stroke Family Assessment Needed No   Intensity of Service   Intensity of Service 0-30 min     Spoke to pt, role of TCC explained, demographics confirmed. [facility] as prescribed, able to afford and obtain. Lives with yovany, independent in care. Pt reports that diabetes runs in her familyand requests that she be tested before leaving. Statign that her fiance is also diabetic and has many resulting issues from same. \"If I have it I want to know so that I " "can stay on top of it.\" Will request same. CT will follow.   "

## 2025-04-30 NOTE — ASSESSMENT & PLAN NOTE
Etiology is still unclear. ( ? Possibly drug induced, autoimmune, shock liver)  Work up still in progress   Hepatitis Panel showed Positive Hepatitis B surface Antibody and Positive Hepatitis A antibody.   Hepatitis C AB is positive as well  Checking Hep C RNA quantitative.  Appreciate GI recommendation  Continue hold zocor  CK is normal   LFT has been trending down compare to admission.   Possible discharge if okay with GI  Quick improvement of symptoms suggest more acute event.)  Discharge if AST, ALT continues to decrease and go below 500.

## 2025-05-01 VITALS
TEMPERATURE: 96.7 F | HEIGHT: 62 IN | DIASTOLIC BLOOD PRESSURE: 88 MMHG | WEIGHT: 156 LBS | SYSTOLIC BLOOD PRESSURE: 128 MMHG | HEART RATE: 74 BPM | RESPIRATION RATE: 16 BRPM | BODY MASS INDEX: 28.71 KG/M2 | OXYGEN SATURATION: 96 %

## 2025-05-01 DIAGNOSIS — R74.01 TRANSAMINITIS: ICD-10-CM

## 2025-05-01 LAB
ALBUMIN SERPL BCP-MCNC: 2.8 G/DL (ref 3.4–5)
ALP SERPL-CCNC: 210 U/L (ref 33–110)
ALT SERPL W P-5'-P-CCNC: 767 U/L (ref 7–45)
ANION GAP SERPL CALC-SCNC: 10 MMOL/L (ref 10–20)
AST SERPL W P-5'-P-CCNC: 273 U/L (ref 9–39)
BACTERIA UR CULT: ABNORMAL
BILIRUB SERPL-MCNC: 8.1 MG/DL (ref 0–1.2)
BUN SERPL-MCNC: 7 MG/DL (ref 6–23)
CALCIUM SERPL-MCNC: 7.8 MG/DL (ref 8.6–10.3)
CHLORIDE SERPL-SCNC: 104 MMOL/L (ref 98–107)
CO2 SERPL-SCNC: 29 MMOL/L (ref 21–32)
CREAT SERPL-MCNC: 0.81 MG/DL (ref 0.5–1.05)
EGFRCR SERPLBLD CKD-EPI 2021: >90 ML/MIN/1.73M*2
ERYTHROCYTE [DISTWIDTH] IN BLOOD BY AUTOMATED COUNT: 13.7 % (ref 11.5–14.5)
EST. AVERAGE GLUCOSE BLD GHB EST-MCNC: 100 MG/DL
GLUCOSE SERPL-MCNC: 115 MG/DL (ref 74–99)
HBA1C MFR BLD: 5.1 % (ref ?–5.7)
HCT VFR BLD AUTO: 35.7 % (ref 36–46)
HGB BLD-MCNC: 12.3 G/DL (ref 12–16)
INR PPP: 1.1 (ref 0.9–1.1)
MCH RBC QN AUTO: 31.1 PG (ref 26–34)
MCHC RBC AUTO-ENTMCNC: 34.5 G/DL (ref 32–36)
MCV RBC AUTO: 90 FL (ref 80–100)
NRBC BLD-RTO: 0.4 /100 WBCS (ref 0–0)
PLATELET # BLD AUTO: 123 X10*3/UL (ref 150–450)
POTASSIUM SERPL-SCNC: 3.6 MMOL/L (ref 3.5–5.3)
PROT SERPL-MCNC: 5.3 G/DL (ref 6.4–8.2)
PROTHROMBIN TIME: 12.1 SECONDS (ref 9.8–12.4)
RBC # BLD AUTO: 3.96 X10*6/UL (ref 4–5.2)
SODIUM SERPL-SCNC: 139 MMOL/L (ref 136–145)
SOLUBLE LIVER IGG SER IA-ACNC: 1.2 U (ref 0–24.9)
WBC # BLD AUTO: 7 X10*3/UL (ref 4.4–11.3)

## 2025-05-01 PROCEDURE — 2500000002 HC RX 250 W HCPCS SELF ADMINISTERED DRUGS (ALT 637 FOR MEDICARE OP, ALT 636 FOR OP/ED): Performed by: EMERGENCY MEDICINE

## 2025-05-01 PROCEDURE — 99239 HOSP IP/OBS DSCHRG MGMT >30: CPT | Performed by: INTERNAL MEDICINE

## 2025-05-01 PROCEDURE — 2500000004 HC RX 250 GENERAL PHARMACY W/ HCPCS (ALT 636 FOR OP/ED): Performed by: STUDENT IN AN ORGANIZED HEALTH CARE EDUCATION/TRAINING PROGRAM

## 2025-05-01 PROCEDURE — 36415 COLL VENOUS BLD VENIPUNCTURE: CPT | Performed by: NURSE PRACTITIONER

## 2025-05-01 PROCEDURE — S4991 NICOTINE PATCH NONLEGEND: HCPCS | Performed by: EMERGENCY MEDICINE

## 2025-05-01 PROCEDURE — 2500000001 HC RX 250 WO HCPCS SELF ADMINISTERED DRUGS (ALT 637 FOR MEDICARE OP): Performed by: INTERNAL MEDICINE

## 2025-05-01 PROCEDURE — 99232 SBSQ HOSP IP/OBS MODERATE 35: CPT | Performed by: NURSE PRACTITIONER

## 2025-05-01 PROCEDURE — 85610 PROTHROMBIN TIME: CPT | Performed by: NURSE PRACTITIONER

## 2025-05-01 PROCEDURE — 2500000002 HC RX 250 W HCPCS SELF ADMINISTERED DRUGS (ALT 637 FOR MEDICARE OP, ALT 636 FOR OP/ED): Performed by: REGISTERED NURSE

## 2025-05-01 PROCEDURE — 85027 COMPLETE CBC AUTOMATED: CPT | Performed by: NURSE PRACTITIONER

## 2025-05-01 PROCEDURE — 83036 HEMOGLOBIN GLYCOSYLATED A1C: CPT | Mod: PORLAB | Performed by: INTERNAL MEDICINE

## 2025-05-01 PROCEDURE — 84075 ASSAY ALKALINE PHOSPHATASE: CPT | Performed by: NURSE PRACTITIONER

## 2025-05-01 RX ORDER — LACTULOSE 10 G/15ML
20 SOLUTION ORAL DAILY PRN
Start: 2025-05-01

## 2025-05-01 RX ADMIN — POTASSIUM CHLORIDE 40 MEQ: 1500 TABLET, EXTENDED RELEASE ORAL at 08:15

## 2025-05-01 RX ADMIN — ARIPIPRAZOLE 15 MG: 10 TABLET ORAL at 08:14

## 2025-05-01 RX ADMIN — LACTULOSE 20 G: 10 SOLUTION ORAL at 08:15

## 2025-05-01 RX ADMIN — NICOTINE 1 PATCH: 21 PATCH, EXTENDED RELEASE TRANSDERMAL at 08:15

## 2025-05-01 RX ADMIN — POLYETHYLENE GLYCOL 3350 17 G: 17 POWDER, FOR SOLUTION ORAL at 08:14

## 2025-05-01 ASSESSMENT — COGNITIVE AND FUNCTIONAL STATUS - GENERAL
DAILY ACTIVITIY SCORE: 24
MOBILITY SCORE: 24

## 2025-05-01 ASSESSMENT — PAIN SCALES - GENERAL: PAINLEVEL_OUTOF10: 0 - NO PAIN

## 2025-05-01 NOTE — DISCHARGE SUMMARY
Discharge Diagnosis  Transaminitis  Drug abuse  Drug induced constipation.  Hypokalemia  DEXTER  Hx of Bipolar    Issues Requiring Follow-Up      Discharge Meds     Medication List      START taking these medications     lactulose 20 gram/30 mL oral solution; Take 30 mL (20 g) by mouth once   daily as needed (constipation).     CONTINUE taking these medications     ARIPiprazole 15 mg tablet; Commonly known as: Abilify   lamoTRIgine 150 mg tablet; Commonly known as: LaMICtal   simvastatin 20 mg tablet; Commonly known as: Zocor   Sublocade 300 mg/1.5 mL injection; Generic drug: buprenorphine ER     STOP taking these medications     acetaminophen 500 mg tablet; Commonly known as: Tylenol       Test Results Pending At Discharge  Pending Labs       Order Current Status    Alpha-1 Antitrypsin Phenotype In process    Hemochromatosis Mutation In process    Hemoglobin A1c In process    Liver/Kidney Microsome Type 1 Antibodies, Serum In process    SLOAN FibroSure In process    Soluble Liver Ag Abs In process            Hospital Course  Mrs. Hernández presented to the hospital with jaundice, nausea and vomiting. She was found to have transaminitis. Further evaluation showed immunization to hep B and A, but active hep c infection with Hep C Rna of 24, 400 K. GI saw patient and recommended out patient hepatitis c treatment.  Her Liver function was monitored and significantly improved at the time of her discharge. LFT standing order was placed at the time of discharge.   She has DEXTER and this was treated with IV fluid and responded appropriately.    She had constipation, lactulose was added with good result    Visit Vitals  /88 (BP Location: Left arm, Patient Position: Sitting)   Pulse 74   Temp 35.9 °C (96.7 °F) (Temporal)   Resp 16   Pertinent Physical Exam At Time of Discharge  Physical Exam  Constitutional:       Appearance: Normal appearance.   HENT:      Head: Normocephalic and atraumatic.      Nose: Nose normal.   Eyes:       Extraocular Movements: Extraocular movements intact.      Pupils: Pupils are equal, round, and reactive to light.   Cardiovascular:      Rate and Rhythm: Normal rate and regular rhythm.      Pulses: Normal pulses.      Heart sounds: Normal heart sounds.   Pulmonary:      Effort: Pulmonary effort is normal.      Breath sounds: Normal breath sounds.   Abdominal:      General: Bowel sounds are normal.      Palpations: Abdomen is soft.   Musculoskeletal:      Cervical back: Neck supple.   Neurological:      General: No focal deficit present.      Mental Status: She is alert and oriented to person, place, and time.   Psychiatric:         Mood and Affect: Mood normal.         Behavior: Behavior normal.         Outpatient Follow-Up  Future Appointments   Date Time Provider Department Center   6/27/2025  9:00 AM Marc Garvey MD [id number]     Time spent in discharge of this patient is greater than 30 minutes    Osman Rome MD

## 2025-05-01 NOTE — PROGRESS NOTES
"  [facility] Gastroenterology Progress Note    ASSESSMENT and PLAN:       Elena Hernández is a 35 y.o. female with a significant past medical history of depression and bipolar disorder, anxiety, PTSD, hyperlipidemia recently discharged from inpatient drug detox but unfortunately began using cocaine/crack, methamphetamine with MDMA and fentanyl who presented with jaundice and constipation. GI was consulted for \"hepatitis\".       Elevated LFTs   Patient presented with jaundice, nausea, constipation, and abdominal pain. She was at a detox facility prior to this admission and was getting tylenol Q6 hours, though acetaminophen level on admission was <10. Transaminases and bilirubin were markedly elevated on presentation, but are now trending down. Imaging showed findings suggestive of acute hepatitis in addition to large volume stool in the colon. Additional liver labs show active HCV, but only prior exposures hepatitis A and hepatitis B   - daily CMP and INR while admitted    - avoid hepatotoxic medications  - follow up has been arranged for HCV treatment   - ok to discharge from GI standpoint      2. Constipation   Continue bowel regimen with miralax 17g daily.      Discussed with Dr. Jocelin Hartman, SUZ[facility]        SUBJECTIVE and INTERVAL HISTORY:       Elena Hernández is a 35 y.o. female with a significant past medical history of depression and bipolar disorder, anxiety, PTSD, hyperlipidemia recently discharged from inpatient drug detox but unfortunately began using cocaine/crack, methamphetamine with MDMA and fentanyl who presented with jaundice and constipation. GI was consulted for \"hepatitis\".    HPI:  Patient seen and examined. She is feeling better and abdominal pain/bloating have resolved. She has had 3 BMs since yesterday. No melena, hematemesis. Remains jaundiced, though it is improving. Patient feels ready to return to rehab.         Review of systems:     Patient denies any heartburn/GERD, " "N/V, dysphagia, odynophagia, abdominal pain, diarrhea, constipation, hematemesis, hematochezia, melena, or weight loss.    I performed a complete 10 point review of systems and it is negative except as noted in HPI or above.    All other systems have been reviewed and are negative.          OBJECTIVE:       Last Recorded Vitals:  Vitals:    04/30/25 1700 04/30/25 2101 05/01/25 0117 05/01/25 0502   BP: 119/76 (!) 136/91 122/73 116/80   BP Location: Left arm Left arm Left arm Left arm   Patient Position: Lying Lying Lying Lying   Pulse: 76 80 74 71   Resp: 18 18 18 18   Temp: 36.6 °C (97.8 °F) 36.2 °C (97.2 °F) 36.2 °C (97.2 °F) 36.4 °C (97.5 °F)   TempSrc: Temporal Temporal Temporal Temporal   SpO2: 95% 96% 94% 92%   Weight:       Height:         /80 (BP Location: Left arm, Patient Position: Lying)   Pulse 71   Temp 36.4 °C (97.5 °F) (Temporal)   Resp 18   Ht 1.575 m (5' 2\")   Wt 70.8 kg (156 lb)   SpO2 92%   BMI 28.53 kg/m²      Physical Exam:    Physical Exam  Constitutional:       General: She is not in acute distress.  HENT:      Mouth/Throat:      Mouth: Mucous membranes are moist.   Eyes:      General: Scleral icterus present.      Conjunctiva/sclera: Conjunctivae normal.      Pupils: Pupils are equal, round, and reactive to light.   Cardiovascular:      Rate and Rhythm: Normal rate and regular rhythm.      Heart sounds: No murmur heard.  Pulmonary:      Effort: Pulmonary effort is normal.      Breath sounds: Normal breath sounds.   Abdominal:      General: Bowel sounds are normal. There is no distension.      Palpations: Abdomen is soft.      Tenderness: There is no abdominal tenderness. There is no guarding.   Skin:     General: Skin is warm and dry.      Coloration: Skin is jaundiced.   Neurological:      Mental Status: She is alert and oriented to person, place, and time.   Psychiatric:         Mood and Affect: Mood normal.         Behavior: Behavior normal.           Inpatient " Medications:  Scheduled Medications[1]  PRN Medications[2]    Outpatient Medications:  Prior to Admission medications    Medication Sig Start Date End Date Taking? Authorizing Provider   ARIPiprazole (Abilify) 15 mg tablet Take 1 tablet (15 mg) by mouth once daily.   Yes Historical Provider, MD   buprenorphine ER (Sublocade) 300 mg/1.5mL injection Inject 1.5 mL (1 each) under the skin every month to absorb continually. NEXT INJECTION DUE 5/10/25   Yes Historical Provider, MD   lamoTRIgine (LaMICtal) 150 mg tablet Take 1 tablet (150 mg) by mouth once daily.   Yes Historical Provider, MD   simvastatin (Zocor) 20 mg tablet Take 1 tablet (20 mg) by mouth once daily at bedtime.   Yes Historical Provider, MD   acetaminophen (Tylenol) 500 mg tablet Take by mouth every 6 hours if needed for mild pain (1 - 3).    Historical Provider, MD       LABS AND IMAGING:     Labs:  Recent labs reviewed in the EMR.    Lab Results   Component Value Date    WBC 7.0 05/01/2025    HGB 12.3 05/01/2025    HGB 13.2 04/30/2025    HGB 15.0 04/28/2025    MCV 90 05/01/2025     (L) 05/01/2025     (L) 04/30/2025     (L) 04/28/2025    IRON 181 (H) 04/29/2025    TIBC  04/29/2025      Comment:      One or more of the analytes used in this calculation is outside of the analytical measurement range.      IRONSAT  04/29/2025      Comment:      One or more analytes used in this calculation is outside of the analytical measurement range. Calculation cannot be performed.    FERRITIN 2,470 (H) 04/29/2025       Lab Results   Component Value Date     05/01/2025    K 3.6 05/01/2025     05/01/2025    BUN 7 05/01/2025    CREATININE 0.81 05/01/2025    CREATININE 0.94 04/30/2025       Lab Results   Component Value Date    BILITOT 8.1 (H) 05/01/2025    BILITOT 11.7 (H) 04/30/2025    BILITOT 10.9 (H) 04/30/2025    BILIDIR 6.1 (H) 04/28/2025    ALKPHOS 210 (H) 05/01/2025    ALKPHOS 253 (H) 04/30/2025    ALKPHOS 226 (H) 04/30/2025    AST  "273 (H) 05/01/2025     (H) 04/30/2025     (H) 04/30/2025     (H) 05/01/2025    ALT 1,268 (H) 04/30/2025    ALT 1,197 (H) 04/30/2025    LIPASE 12 04/28/2025       No results found for: \"CRP\", \"CALPS\"      Imaging:  Recent imaging results reviewed.             [1] ARIPiprazole, 15 mg, oral, Daily  lactulose, 20 g, oral, Daily  nicotine, 1 patch, transdermal, Daily  polyethylene glycol, 17 g, oral, Daily  potassium chloride CR, 40 mEq, oral, BID     [2] PRN medications: bisacodyl, bisacodyl, guaiFENesin, ibuprofen, melatonin, ondansetron **OR** ondansetron    "

## 2025-05-01 NOTE — NURSING NOTE
Discharging instructions discussed with patient. Patient also understands follow-up protocol.  IVs removed.  All belongings returned and patient ready to leave. Patient safely discharged with family.

## 2025-05-01 NOTE — CARE PLAN
The patient's goals for the shift include  discharge    The clinical goals for the shift include patient to remain free from injury throughout shift

## 2025-05-02 DIAGNOSIS — R74.01 TRANSAMINITIS: ICD-10-CM

## 2025-05-02 LAB
A2 MACROGLOB SERPL-MCNC: 150 MG/DL (ref 110–276)
ALT SERPL W P-5'-P-CCNC: 1651 IU/L (ref 0–40)
APO A-I SERPL-MCNC: 22 MG/DL (ref 116–209)
AST SERPL W P-5'-P-CCNC: 1251 IU/L (ref 0–40)
BILIRUB SERPL-MCNC: 7.6 MG/DL (ref 0–1.2)
CHOLEST SERPL-MCNC: 65 MG/DL (ref 100–199)
FIBROSIS STAGE SERPL QL: ABNORMAL
GGT SERPL-CCNC: 113 IU/L (ref 0–60)
GLUCOSE SERPL-MCNC: 115 MG/DL (ref 70–99)
HAPTOGLOB SERPL-MCNC: 59 MG/DL (ref 33–278)
LABORATORY COMMENT REPORT: ABNORMAL
LKM AB TITR SER IF: NORMAL {TITER}
TEST PERFORMANCE INFO SPEC: ABNORMAL
TEST PERFORMANCE INFO SPEC: ABNORMAL
TRIGL SERPL-MCNC: 231 MG/DL (ref 0–149)

## 2025-05-04 LAB
A1AT PHENOTYP SERPL-IMP: NORMAL
A1AT SERPL-MCNC: 194 MG/DL (ref 90–200)

## 2025-05-05 LAB
ELECTRONICALLY SIGNED BY: NORMAL
HFE GENE MUT TESTED BLD/T: NORMAL
HFE P.C282Y BLD/T QL: NORMAL
HFE P.H63D BLD/T QL: NORMAL

## 2025-05-09 DIAGNOSIS — R74.01 TRANSAMINITIS: ICD-10-CM

## 2025-05-16 DIAGNOSIS — R74.01 TRANSAMINITIS: ICD-10-CM

## 2025-05-23 DIAGNOSIS — R74.01 TRANSAMINITIS: ICD-10-CM

## 2025-05-30 DIAGNOSIS — R74.01 TRANSAMINITIS: ICD-10-CM

## 2025-06-27 ENCOUNTER — APPOINTMENT (OUTPATIENT)
Facility: CLINIC | Age: 35
End: 2025-06-27
Payer: COMMERCIAL

## 2025-06-27 VITALS
HEART RATE: 97 BPM | WEIGHT: 153 LBS | HEIGHT: 62 IN | BODY MASS INDEX: 28.16 KG/M2 | DIASTOLIC BLOOD PRESSURE: 79 MMHG | SYSTOLIC BLOOD PRESSURE: 117 MMHG

## 2025-06-27 DIAGNOSIS — B18.2 CHRONIC HEPATITIS C WITHOUT HEPATIC COMA (MULTI): Primary | ICD-10-CM

## 2025-06-27 PROCEDURE — 99214 OFFICE O/P EST MOD 30 MIN: CPT | Performed by: INTERNAL MEDICINE

## 2025-06-27 PROCEDURE — 3008F BODY MASS INDEX DOCD: CPT | Performed by: INTERNAL MEDICINE

## 2025-06-27 NOTE — ASSESSMENT & PLAN NOTE
Chronic hepatitis C with marked transaminitis.  Positive hep C antibody and PCR with 24,000 I viral load.  Genotype pending.  Last CMP was the first week of May and still showed a total bilirubin of 11 and an ALT of 767, trending downward.  Normal coags but very high ferritin of 2470.  Iron saturation not measurable/resulted.   Plan repeat comprehensive metabolic panel and although the ferritin likely is in part elevated as an acute phase reactant, this could be secondary iron overload seen in chronic liver disease.  Hereditary hemochromatosis should be excluded.    Currently no symptoms of chronic liver disease.  She is likely a good candidate for therapy.  We will order tests of fibrosis including elastography and a FibroSure.  Discussed in detail current hepatitis C treatment including medication options, safety and efficacy, and additional evaluation.  Orders:    Follow Up In Gastroenterology; Future    Hepatitis C Virus (HCV) FibroSure; Future    Comprehensive Metabolic Panel; Future

## 2025-06-27 NOTE — PROGRESS NOTES
[facility] Gastroenterology    ASSESSMENT and PLAN:            Assessment & Plan  Chronic hepatitis C without hepatic coma (Multi)  Chronic hepatitis C with marked transaminitis.  Positive hep C antibody and PCR with 24,000 I viral load.  Genotype pending.  Last CMP was the first week of May and still showed a total bilirubin of 11 and an ALT of 767, trending downward.  Normal coags but very high ferritin of 2470.  Iron saturation not measurable/resulted.   Plan repeat comprehensive metabolic panel and although the ferritin likely is in part elevated as an acute phase reactant, this could be secondary iron overload seen in chronic liver disease.  Hereditary hemochromatosis should be excluded.    Currently no symptoms of chronic liver disease.  She is likely a good candidate for therapy.  We will order tests of fibrosis including elastography and a FibroSure.  Discussed in detail current hepatitis C treatment including medication options, safety and efficacy, and additional evaluation.  Orders:    Follow Up In Gastroenterology; Future    Hepatitis C Virus (HCV) FibroSure; Future    Comprehensive Metabolic Panel; Future      Marc Garvey III, MD, MHA, FACP, FACG, ARMINDA, GREGF    Attending Physician, Gastroenterology    [facility]            Subjective   HISTORY OF PRESENT ILLNESS:     Chief Complaint  New Patient Visit (HCV treatment - hospital stay 4/28-5/1 - no prior scopes found.)    History Of Present Illness:    Elena Hernández is a 35 y.o. female with a significant past medical history of opiate dependence who presents for consultation  for the evaluation of abnormal liver function tests and positive hepatitis C testing.  Currently the patient has no symptoms of chronic liver disease, specifically no jaundice, pruritus, acholic stools, altered sensorium, scleral icterus, or bleeding.  She was however recently admitted to the hospital 4/28/2025  to 5/1/2025 where she presented with marked elevation of her transaminases and bilirubin.  CAT scan showed abnormal liver but there was periportal edema significant gallbladder wall thickening and pericholecystic fluid noted.  She had presented with abdominal pain, nausea vomiting and constipation.  She had obvious jaundice, scleral icterus, tea colored urine and these have all resolved.  Acute hepatitis battery showed positive hepatitis C and immunity to hepatitis B.  GI consultation was obtained and recommended outpatient treatment for her hepatitis C.  Overall she is healthy.  No history of prior liver disease.  Only risk factor for exposure is that of IV drug abuse dating back to the age of about 23.  Blood transfusions, needlestick injury, exposure to anyone with known hepatitis C, and professionally done tattoos.  The 11 years of potential chronic hepatitis would put her at risk for advanced fibrosis or cirrhosis.     Patient denies any heartburn/GERD, N/V, dysphagia, odynophagia, abdominal pain, diarrhea, constipation, hematemesis, hematochezia, melena, or weight loss.  No further evaluation for the gallbladder findings on her CAT scan or symptoms of biliary colic.    Endoscopy History:    None.    Review of systems:   Review of Systems    I performed a complete 10 point review of systems and it is negative except as noted in HPI or above.      PAST HISTORIES:       Past Medical History:  Problem List[1]  She has a past medical history of Cannabis use, unspecified, uncomplicated (01/17/2018), Cocaine abuse, uncomplicated (Multi) (01/17/2018), Cutaneous abscess of right upper limb (01/17/2018), Opioid dependence, uncomplicated (Multi) (02/09/2018), Personal history of other diseases of the digestive system (01/30/2018), Personal history of other mental and behavioral disorders (01/08/2018), Personal history of other mental and behavioral disorders (01/08/2018), Personal history of other mental and behavioral  "disorders (01/08/2018), Personal history of other mental and behavioral disorders (01/08/2018), Personal history of other mental and behavioral disorders (01/08/2018), and Personal history of other specified conditions (01/08/2018).    Past Surgical History:  She has a past surgical history that includes Other surgical history (01/08/2018).      Social History:  She reports that she has been smoking cigarettes. She has never used smokeless tobacco. She reports current alcohol use. She reports current drug use. Drugs: Methamphetamines, Marijuana, MDMA (ecstacy), and \"Crack\" cocaine.    Family History:  No known family history of GI disease, specifically denies any family history of pancreatitis, Crohn's, colon cancer, gastroesophageal cancer, or ulcerative colitis.    Family History[2]     Allergies:  Patient has no known allergies.      Objective   OBJECTIVE:       Last Recorded Vitals:  Vitals:    06/27/25 0857   BP: 117/79   Pulse: 97   Weight: 69.4 kg (153 lb)   Height: 1.575 m (5' 2\")     /79   Pulse 97   Ht 1.575 m (5' 2\")   Wt 69.4 kg (153 lb)   BMI 27.98 kg/m²      Physical Exam:    Physical Exam  Vitals reviewed.   Constitutional:       General: She is awake.      Appearance: Normal appearance.   HENT:      Head: Normocephalic and atraumatic.      Nose: Nose normal.      Mouth/Throat:      Mouth: Mucous membranes are moist.   Eyes:      General: No scleral icterus.     Extraocular Movements: Extraocular movements intact.   Neck:      Thyroid: No thyroid mass.      Trachea: Phonation normal.   Cardiovascular:      Rate and Rhythm: Normal rate and regular rhythm.      Heart sounds: Normal heart sounds. No murmur heard.     No gallop.   Pulmonary:      Effort: Pulmonary effort is normal. No respiratory distress.      Breath sounds: Normal air entry. No decreased breath sounds, wheezing, rhonchi or rales.   Abdominal:      General: Bowel sounds are normal. There is no distension.      Palpations: " Abdomen is soft.      Tenderness: There is no abdominal tenderness.   Musculoskeletal:      Cervical back: Neck supple.      Right lower leg: No edema.      Left lower leg: No edema.   Skin:     General: Skin is warm and dry.      Coloration: Skin is not jaundiced.   Neurological:      Mental Status: She is alert and oriented to person, place, and time. Mental status is at baseline.      Cranial Nerves: Cranial nerves 2-12 are intact.      Motor: Motor function is intact.   Psychiatric:         Attention and Perception: Attention and perception normal.         Mood and Affect: Mood normal.         Speech: Speech normal.         Behavior: Behavior normal.       Home Medications:  Prior to Admission medications    Medication Sig Start Date End Date Taking? Authorizing Provider   ARIPiprazole (Abilify) 15 mg tablet Take 1 tablet (15 mg) by mouth once daily.   Yes Historical Provider, MD   buprenorphine ER (Sublocade) 300 mg/1.5mL injection Inject 1.5 mL (1 each) under the skin every month to absorb continually. NEXT INJECTION DUE 5/10/25   Yes Historical Provider, MD   lactulose 20 gram/30 mL oral solution Take 30 mL (20 g) by mouth once daily as needed (constipation). 5/1/25  Yes Osman Rome MD   lamoTRIgine (LaMICtal) 150 mg tablet Take 1 tablet (150 mg) by mouth once daily.   Yes Historical Provider, MD   simvastatin (Zocor) 20 mg tablet Take 1 tablet (20 mg) by mouth once daily at bedtime.   Yes Historical Provider, MD         Relevant Results Recent labs reviewed in the EMR.    Lab Results   Component Value Date/Time    WBC 7.0 05/01/2025 0608    HGB 12.3 05/01/2025 0608    HGB 13.2 04/30/2025 0647    HGB 15.0 04/28/2025 2301    MCV 90 05/01/2025 0608     (L) 05/01/2025 0608     (L) 04/30/2025 0647     (L) 04/28/2025 2301    IRON 181 (H) 04/29/2025 0622    TIBC  04/29/2025 0622      Comment:      One or more of the analytes used in this calculation is outside of the analytical  measurement range.      IRONSAT  04/29/2025 0622      Comment:      One or more analytes used in this calculation is outside of the analytical measurement range. Calculation cannot be performed.    FERRITIN 2,470 (H) 04/29/2025 0622       Lab Results   Component Value Date/Time     05/01/2025 0608    K 3.6 05/01/2025 0608     05/01/2025 0608    BUN 7 05/01/2025 0608    CREATININE 0.81 05/01/2025 0608    CREATININE 0.94 04/30/2025 0846       Lab Results   Component Value Date/Time    BILITOT 8.1 (H) 05/01/2025 0608    BILITOT 11.7 (H) 04/30/2025 0846    BILITOT 10.9 (H) 04/30/2025 0649    BILIDIR 6.1 (H) 04/28/2025 2301    ALKPHOS 210 (H) 05/01/2025 0608    ALKPHOS 253 (H) 04/30/2025 0846    ALKPHOS 226 (H) 04/30/2025 0649     (H) 05/01/2025 0608     (H) 04/30/2025 0846     (H) 04/30/2025 0649     (H) 05/01/2025 0608    ALT 1,268 (H) 04/30/2025 0846    ALT 1,197 (H) 04/30/2025 0649    LIPASE 12 04/28/2025 2301                  [1]   Patient Active Problem List  Diagnosis    Transaminitis    Elevated bilirubin    Chronic hepatitis C without hepatic coma (Multi)   [2] No family history on file.

## 2025-07-01 LAB
A2 MACROGLOB SERPL-MCNC: NORMAL
ALBUMIN SERPL-MCNC: 4.6 G/DL (ref 3.6–5.1)
ALP SERPL-CCNC: 91 U/L (ref 31–125)
ALT SERPL-CCNC: 52 U/L (ref 6–29)
ALT SERPL-CCNC: NORMAL U/L
ANION GAP SERPL CALCULATED.4IONS-SCNC: 7 MMOL/L (CALC) (ref 7–17)
APO A-I SERPL-MCNC: NORMAL
AST SERPL-CCNC: 52 U/L (ref 10–30)
BILIRUB SERPL-MCNC: 0.5 MG/DL (ref 0.2–1.2)
BILIRUB SERPL-MCNC: NORMAL MG/DL
BUN SERPL-MCNC: 12 MG/DL (ref 7–25)
CALCIUM SERPL-MCNC: 9.2 MG/DL (ref 8.6–10.2)
CHLORIDE SERPL-SCNC: 104 MMOL/L (ref 98–110)
CO2 SERPL-SCNC: 28 MMOL/L (ref 20–32)
CREAT SERPL-MCNC: 0.78 MG/DL (ref 0.5–0.97)
EGFRCR SERPLBLD CKD-EPI 2021: 103 ML/MIN/1.73M2
FIBROSIS STAGE SERPL QL: NORMAL
GGT SERPL-CCNC: NORMAL U/L
GLUCOSE SERPL-MCNC: 94 MG/DL (ref 65–139)
HAPTOGLOB SERPL-MCNC: NORMAL MG/DL
LABORATORY COMMENT REPORT: NORMAL
LIVER FIBR SCORE SERPL CALC.FIBROSURE: NORMAL
LIVER FIBROSIS INTERPRETATION SER-IMP: NORMAL
NECROINFLAMMATORY ACT GRADE SERPL QL: NORMAL
NECROINFLAMMATORY ACT SCORE SERPL: NORMAL
NECROINFLAMMATORY ACTIV INTERP SER-IMP: NORMAL
POTASSIUM SERPL-SCNC: 4.8 MMOL/L (ref 3.5–5.3)
PROT SERPL-MCNC: 7.2 G/DL (ref 6.1–8.1)
QUEST HCV REFERENCE ID: NORMAL
SODIUM SERPL-SCNC: 139 MMOL/L (ref 135–146)

## 2025-07-08 ENCOUNTER — TELEPHONE (OUTPATIENT)
Facility: CLINIC | Age: 35
End: 2025-07-08
Payer: COMMERCIAL

## 2025-07-08 NOTE — TELEPHONE ENCOUNTER
----- Message from Marc Garvey sent at 7/2/2025  8:27 AM EDT -----  Your liver function tests, the ALT and the AST, were very very elevated but have now resolved substantially toward near normal.  I will keep you up-to-date as further laboratories become available.  ----- Message -----  From: [facility] [employer] Results In  Sent: 7/1/2025   9:25 PM EDT  To: Marc Garvey MD

## 2025-07-09 LAB
A2 MACROGLOB SERPL-MCNC: 211 MG/DL (ref 106–279)
ALBUMIN SERPL-MCNC: 4.6 G/DL (ref 3.6–5.1)
ALP SERPL-CCNC: 91 U/L (ref 31–125)
ALT SERPL-CCNC: 49 U/L (ref 6–29)
ALT SERPL-CCNC: 52 U/L (ref 6–29)
ANION GAP SERPL CALCULATED.4IONS-SCNC: 7 MMOL/L (CALC) (ref 7–17)
APO A-I SERPL-MCNC: 147 MG/DL (ref 101–198)
AST SERPL-CCNC: 52 U/L (ref 10–30)
BILIRUB SERPL-MCNC: 0.4 MG/DL (ref 0.2–1.2)
BILIRUB SERPL-MCNC: 0.5 MG/DL (ref 0.2–1.2)
BUN SERPL-MCNC: 12 MG/DL (ref 7–25)
CALCIUM SERPL-MCNC: 9.2 MG/DL (ref 8.6–10.2)
CHLORIDE SERPL-SCNC: 104 MMOL/L (ref 98–110)
CO2 SERPL-SCNC: 28 MMOL/L (ref 20–32)
CREAT SERPL-MCNC: 0.78 MG/DL (ref 0.5–0.97)
EGFRCR SERPLBLD CKD-EPI 2021: 103 ML/MIN/1.73M2
FIBROSIS STAGE SERPL QL: ABNORMAL
GGT SERPL-CCNC: 65 U/L (ref 3–50)
GLUCOSE SERPL-MCNC: 94 MG/DL (ref 65–139)
HAPTOGLOB SERPL-MCNC: 114 MG/DL (ref 43–212)
LABORATORY COMMENT REPORT: ABNORMAL
LIVER FIBR SCORE SERPL CALC.FIBROSURE: 0.16
LIVER FIBROSIS INTERPRETATION SER-IMP: ABNORMAL
NECROINFLAMMATORY ACT GRADE SERPL QL: ABNORMAL
NECROINFLAMMATORY ACT SCORE SERPL: 0.24
NECROINFLAMMATORY ACTIV INTERP SER-IMP: ABNORMAL
POTASSIUM SERPL-SCNC: 4.8 MMOL/L (ref 3.5–5.3)
PROT SERPL-MCNC: 7.2 G/DL (ref 6.1–8.1)
QUEST HCV REFERENCE ID: ABNORMAL
SODIUM SERPL-SCNC: 139 MMOL/L (ref 135–146)

## 2025-07-11 ENCOUNTER — TELEPHONE (OUTPATIENT)
Facility: CLINIC | Age: 35
End: 2025-07-11
Payer: COMMERCIAL

## 2025-07-11 NOTE — TELEPHONE ENCOUNTER
----- Message from Marc Garvey sent at 7/10/2025 12:01 PM EDT -----  Your liver fibrosis was 0 meaning there is no scarring of the liver.  We can discuss further at your follow-up and review therapeutic options.  Please call if questions.  ----- Message -----  From: [facility] Results In  Sent: 7/1/2025   9:25 PM EDT  To: Marc Garvey MD

## 2025-08-19 ENCOUNTER — APPOINTMENT (OUTPATIENT)
Facility: CLINIC | Age: 35
End: 2025-08-19
Payer: COMMERCIAL

## 2025-08-21 ENCOUNTER — APPOINTMENT (OUTPATIENT)
Facility: CLINIC | Age: 35
End: 2025-08-21
Payer: COMMERCIAL

## 2025-09-10 ENCOUNTER — APPOINTMENT (OUTPATIENT)
Facility: CLINIC | Age: 35
End: 2025-09-10
Payer: COMMERCIAL